# Patient Record
Sex: FEMALE | Race: BLACK OR AFRICAN AMERICAN | NOT HISPANIC OR LATINO | Employment: FULL TIME | RURAL
[De-identification: names, ages, dates, MRNs, and addresses within clinical notes are randomized per-mention and may not be internally consistent; named-entity substitution may affect disease eponyms.]

---

## 2022-09-17 ENCOUNTER — OUTSIDE PLACE OF SERVICE (OUTPATIENT)
Dept: CARDIOLOGY | Facility: HOSPITAL | Age: 52
End: 2022-09-17
Payer: COMMERCIAL

## 2022-09-17 PROCEDURE — 93010 ELECTROCARDIOGRAM REPORT: CPT | Mod: ,,, | Performed by: STUDENT IN AN ORGANIZED HEALTH CARE EDUCATION/TRAINING PROGRAM

## 2022-09-17 PROCEDURE — 93010 PR ELECTROCARDIOGRAM REPORT: ICD-10-PCS | Mod: ,,, | Performed by: STUDENT IN AN ORGANIZED HEALTH CARE EDUCATION/TRAINING PROGRAM

## 2022-09-26 ENCOUNTER — OFFICE VISIT (OUTPATIENT)
Dept: FAMILY MEDICINE | Facility: CLINIC | Age: 52
End: 2022-09-26
Payer: COMMERCIAL

## 2022-09-26 VITALS
OXYGEN SATURATION: 98 % | HEIGHT: 65 IN | RESPIRATION RATE: 18 BRPM | TEMPERATURE: 99 F | BODY MASS INDEX: 38.09 KG/M2 | HEART RATE: 61 BPM | SYSTOLIC BLOOD PRESSURE: 122 MMHG | WEIGHT: 228.63 LBS | DIASTOLIC BLOOD PRESSURE: 70 MMHG

## 2022-09-26 DIAGNOSIS — I10 PRIMARY HYPERTENSION: Chronic | ICD-10-CM

## 2022-09-26 DIAGNOSIS — I25.10 CORONARY ARTERY DISEASE, UNSPECIFIED VESSEL OR LESION TYPE, UNSPECIFIED WHETHER ANGINA PRESENT, UNSPECIFIED WHETHER NATIVE OR TRANSPLANTED HEART: Primary | Chronic | ICD-10-CM

## 2022-09-26 DIAGNOSIS — K21.9 GASTROESOPHAGEAL REFLUX DISEASE WITHOUT ESOPHAGITIS: Chronic | ICD-10-CM

## 2022-09-26 DIAGNOSIS — E78.5 HYPERLIPIDEMIA, UNSPECIFIED HYPERLIPIDEMIA TYPE: Chronic | ICD-10-CM

## 2022-09-26 PROCEDURE — 3008F PR BODY MASS INDEX (BMI) DOCUMENTED: ICD-10-PCS | Mod: ,,, | Performed by: NURSE PRACTITIONER

## 2022-09-26 PROCEDURE — 3078F PR MOST RECENT DIASTOLIC BLOOD PRESSURE < 80 MM HG: ICD-10-PCS | Mod: ,,, | Performed by: NURSE PRACTITIONER

## 2022-09-26 PROCEDURE — 4010F ACE/ARB THERAPY RXD/TAKEN: CPT | Mod: ,,, | Performed by: NURSE PRACTITIONER

## 2022-09-26 PROCEDURE — 99204 PR OFFICE/OUTPT VISIT, NEW, LEVL IV, 45-59 MIN: ICD-10-PCS | Mod: ,,, | Performed by: NURSE PRACTITIONER

## 2022-09-26 PROCEDURE — 4010F PR ACE/ARB THEARPY RXD/TAKEN: ICD-10-PCS | Mod: ,,, | Performed by: NURSE PRACTITIONER

## 2022-09-26 PROCEDURE — 3078F DIAST BP <80 MM HG: CPT | Mod: ,,, | Performed by: NURSE PRACTITIONER

## 2022-09-26 PROCEDURE — 1160F RVW MEDS BY RX/DR IN RCRD: CPT | Mod: ,,, | Performed by: NURSE PRACTITIONER

## 2022-09-26 PROCEDURE — 1159F MED LIST DOCD IN RCRD: CPT | Mod: ,,, | Performed by: NURSE PRACTITIONER

## 2022-09-26 PROCEDURE — 3074F SYST BP LT 130 MM HG: CPT | Mod: ,,, | Performed by: NURSE PRACTITIONER

## 2022-09-26 PROCEDURE — 3074F PR MOST RECENT SYSTOLIC BLOOD PRESSURE < 130 MM HG: ICD-10-PCS | Mod: ,,, | Performed by: NURSE PRACTITIONER

## 2022-09-26 PROCEDURE — 1159F PR MEDICATION LIST DOCUMENTED IN MEDICAL RECORD: ICD-10-PCS | Mod: ,,, | Performed by: NURSE PRACTITIONER

## 2022-09-26 PROCEDURE — 3008F BODY MASS INDEX DOCD: CPT | Mod: ,,, | Performed by: NURSE PRACTITIONER

## 2022-09-26 PROCEDURE — 1160F PR REVIEW ALL MEDS BY PRESCRIBER/CLIN PHARMACIST DOCUMENTED: ICD-10-PCS | Mod: ,,, | Performed by: NURSE PRACTITIONER

## 2022-09-26 PROCEDURE — 99204 OFFICE O/P NEW MOD 45 MIN: CPT | Mod: ,,, | Performed by: NURSE PRACTITIONER

## 2022-09-26 RX ORDER — PANTOPRAZOLE SODIUM 40 MG/1
40 TABLET, DELAYED RELEASE ORAL
COMMUNITY
Start: 2022-09-20 | End: 2022-10-20

## 2022-09-26 RX ORDER — ASPIRIN 81 MG/1
1 TABLET ORAL EVERY MORNING
COMMUNITY
Start: 2022-09-21 | End: 2023-09-21

## 2022-09-26 RX ORDER — FERROUS SULFATE 325(65) MG
325 TABLET, DELAYED RELEASE (ENTERIC COATED) ORAL
COMMUNITY
Start: 2022-09-22 | End: 2022-11-21

## 2022-09-26 RX ORDER — METOPROLOL SUCCINATE 25 MG/1
25 TABLET, EXTENDED RELEASE ORAL
COMMUNITY
Start: 2022-09-21 | End: 2022-10-21

## 2022-09-26 RX ORDER — ATORVASTATIN CALCIUM 40 MG/1
40 TABLET, FILM COATED ORAL
COMMUNITY
Start: 2022-09-20 | End: 2023-02-02 | Stop reason: SDUPTHER

## 2022-09-26 RX ORDER — LOSARTAN POTASSIUM 25 MG/1
25 TABLET ORAL
COMMUNITY
Start: 2022-09-21 | End: 2022-10-21

## 2022-09-26 RX ORDER — ONDANSETRON 4 MG/1
4 TABLET, ORALLY DISINTEGRATING ORAL EVERY 8 HOURS PRN
COMMUNITY
Start: 2022-09-20 | End: 2022-09-27

## 2022-09-26 NOTE — PROGRESS NOTES
Gabriela Espino DNP, FNP    [address] [place] [address]     PATIENT NAME: Ro Hitchcock  : 1970  DATE: 22  MRN: [id number]      Billing Provider: Gabriela Espino DNP, FNP  Level of Service:   Patient PCP Information       Provider PCP Type    Gabriela Espino DNP, FNP General            Reason for Visit / Chief Complaint: Follow-up (Went to [facility]  then to [facility] early in the morning on .  Dx with MI and had to have a stent placed.  Discharged home on  and this is f/u.)       Update PCP  Update Chief Complaint         History of Present Illness / Problem Focused Workflow     Ro Hitchcock presents to the clinic with Follow-up (Went to [facility]  then to [facility] early in the morning on .  Dx with MI and had to have a stent placed.  Discharged home on  and this is f/u.)     Pt is here to establish care. Pt recently hospitalized at [facility] for NSTEMI. Had heart cath with stent x 1. Pt is smoker. Currently trying to stop.   No history of hypertension or diabetes.     Follow-up  Pertinent negatives include no abdominal pain, arthralgias, change in bowel habit, chest pain, chills, congestion, coughing, fatigue, fever, headaches, myalgias, nausea, rash, sore throat, vomiting or weakness.     Review of Systems     Review of Systems   Constitutional:  Negative for activity change, appetite change, chills, fatigue and fever.   HENT:  Negative for nasal congestion, ear pain, hearing loss, postnasal drip and sore throat.    Respiratory:  Negative for cough, chest tightness, shortness of breath and wheezing.    Cardiovascular:  Negative for chest pain, palpitations, leg swelling and claudication.   Gastrointestinal:  Negative for abdominal pain, change in bowel habit, constipation, diarrhea, nausea, vomiting and change in bowel habit.   Genitourinary:  Negative for dysuria.   Musculoskeletal:  Negative for arthralgias, back  pain, gait problem and myalgias.   Integumentary:  Negative for rash.   Neurological:  Negative for weakness and headaches.   Psychiatric/Behavioral:  Negative for suicidal ideas. The patient is not nervous/anxious.       Medical / Social / Family History     Past Medical History:   Diagnosis Date    Coronary artery disease     stent placed 9/19/22    GERD (gastroesophageal reflux disease)     Hyperlipidemia     Hypertension        Past Surgical History:   Procedure Laterality Date    CHOLECYSTECTOMY         Social History  Ms. Ro Hitchcock  reports that she has been smoking cigarettes. She has been smoking an average of .25 packs per day. She has never used smokeless tobacco. She reports that she does not currently use alcohol. She reports that she does not use drugs.    Family History  Ms. Ro Hitchcock's family history includes Cancer in her sister; Diabetes in her father and sister; Hypertension in her mother and sister.    Medications and Allergies     Medications  Outpatient Medications Marked as Taking for the 9/26/22 encounter (Office Visit) with Gabriela Espino, DONNY, FNP   Medication Sig Dispense Refill    aspirin (ECOTRIN) 81 MG EC tablet Take 1 tablet by mouth every morning.      atorvastatin (LIPITOR) 40 MG tablet Take 40 mg by mouth.      ferrous sulfate 325 (65 FE) MG EC tablet Take 325 mg by mouth.      Lactobacillus rhamnosus GG (CULTURELLE) 10 billion cell capsule Take 1 capsule by mouth.      losartan (COZAAR) 25 MG tablet Take 25 mg by mouth.      metoprolol succinate (TOPROL-XL) 25 MG 24 hr tablet Take 25 mg by mouth.      ondansetron (ZOFRAN-ODT) 4 MG TbDL Take 4 mg by mouth every 8 (eight) hours as needed.      pantoprazole (PROTONIX) 40 MG tablet Take 40 mg by mouth.      ticagrelor (BRILINTA) 90 mg tablet Take 90 mg by mouth.         Allergies  Review of patient's allergies indicates:  No Known Allergies    Physical Examination     Vitals:    09/26/22 1358   BP: 122/70   BP Location: Left arm  "  Patient Position: Sitting   BP Method: Large (Manual)   Pulse: 61   Resp: 18   Temp: 98.7 °F (37.1 °C)   TempSrc: Oral   SpO2: 98%   Weight: 103.7 kg (228 lb 9.6 oz)   Height: 5' 5" (1.651 m)     Physical Exam  Vitals and nursing note reviewed.   Constitutional:       General: She is not in acute distress.     Appearance: She is obese.   HENT:      Nose: Nose normal.      Mouth/Throat:      Mouth: Mucous membranes are moist.   Eyes:      Pupils: Pupils are equal, round, and reactive to light.   Cardiovascular:      Rate and Rhythm: Normal rate and regular rhythm.      Pulses: Normal pulses.      Heart sounds: Normal heart sounds. No murmur heard.  Pulmonary:      Effort: Pulmonary effort is normal. No respiratory distress.      Breath sounds: Normal breath sounds. No wheezing, rhonchi or rales.   Chest:      Chest wall: No tenderness.   Abdominal:      General: Bowel sounds are normal.      Palpations: Abdomen is soft.   Musculoskeletal:         General: Normal range of motion.      Cervical back: Normal range of motion and neck supple.      Right lower leg: No edema.      Left lower leg: No edema.   Skin:     General: Skin is warm and dry.   Neurological:      General: No focal deficit present.      Mental Status: She is alert and oriented to person, place, and time.        Assessment and Plan (including Health Maintenance)      Problem List  Smart Sets  Document Outside HM   :    Plan:     Of note, during visit, pt received phone call that her great nephew was in an accident at his home and is not expected to live.     Health Maintenance Due   Topic Date Due    Hepatitis C Screening  Never done    Cervical Cancer Screening  Never done    COVID-19 Vaccine (1) Never done    Pneumococcal Vaccines (Age 0-64) (1 - PCV) Never done    HIV Screening  Never done    TETANUS VACCINE  Never done    Mammogram  Never done    Colorectal Cancer Screening  Never done    Shingles Vaccine (1 of 2) Never done       Problem List " Items Addressed This Visit          Cardiac/Vascular    Coronary artery disease - Primary (Chronic)    Overview     stent placed 9/19/22         Hyperlipidemia (Chronic)    Hypertension (Chronic)       GI    GERD (gastroesophageal reflux disease) (Chronic)     Coronary artery disease, unspecified vessel or lesion type, unspecified whether angina present, unspecified whether native or transplanted heart    Hyperlipidemia, unspecified hyperlipidemia type    Primary hypertension    Gastroesophageal reflux disease without esophagitis     Health Maintenance Topics with due status: Not Due       Topic Last Completion Date    Lipid Panel 09/19/2022    High Dose Statin 09/26/2022           No future appointments.       Follow up if symptoms worsen or fail to improve.     Signature:  Gabriela sEpino DNP, FNP  [address] [place] [address]  Phone #: [phone]  Fax #: [phone]    Date of encounter: 9/26/22    Patient Instructions   Take meds as prescribed. Follow up with cardiology for return to work advice. Follow up here as needed.

## 2022-09-26 NOTE — PATIENT INSTRUCTIONS
Take meds as prescribed. Follow up with cardiology for return to work advice. Follow up here as needed.

## 2023-02-02 ENCOUNTER — OFFICE VISIT (OUTPATIENT)
Dept: FAMILY MEDICINE | Facility: CLINIC | Age: 53
End: 2023-02-02
Payer: COMMERCIAL

## 2023-02-02 VITALS
OXYGEN SATURATION: 98 % | WEIGHT: 234.81 LBS | RESPIRATION RATE: 16 BRPM | DIASTOLIC BLOOD PRESSURE: 83 MMHG | HEART RATE: 89 BPM | SYSTOLIC BLOOD PRESSURE: 139 MMHG | TEMPERATURE: 100 F | HEIGHT: 65 IN | BODY MASS INDEX: 39.12 KG/M2

## 2023-02-02 DIAGNOSIS — I25.10 CORONARY ARTERY DISEASE INVOLVING NATIVE CORONARY ARTERY OF NATIVE HEART, UNSPECIFIED WHETHER ANGINA PRESENT: Chronic | ICD-10-CM

## 2023-02-02 DIAGNOSIS — U07.1 COVID-19: Primary | ICD-10-CM

## 2023-02-02 DIAGNOSIS — J06.9 UPPER RESPIRATORY TRACT INFECTION, UNSPECIFIED TYPE: ICD-10-CM

## 2023-02-02 DIAGNOSIS — I10 PRIMARY HYPERTENSION: Chronic | ICD-10-CM

## 2023-02-02 DIAGNOSIS — E66.01 CLASS 2 SEVERE OBESITY DUE TO EXCESS CALORIES WITH SERIOUS COMORBIDITY AND BODY MASS INDEX (BMI) OF 39.0 TO 39.9 IN ADULT: Chronic | ICD-10-CM

## 2023-02-02 DIAGNOSIS — Z20.822 ENCOUNTER FOR LABORATORY TESTING FOR COVID-19 VIRUS: ICD-10-CM

## 2023-02-02 PROBLEM — E66.812 CLASS 2 SEVERE OBESITY DUE TO EXCESS CALORIES WITH SERIOUS COMORBIDITY AND BODY MASS INDEX (BMI) OF 39.0 TO 39.9 IN ADULT: Chronic | Status: ACTIVE | Noted: 2023-02-02

## 2023-02-02 LAB
CTP QC/QA: YES
FLUAV AG NPH QL: NEGATIVE
FLUBV AG NPH QL: NEGATIVE
SARS-COV-2 AG RESP QL IA.RAPID: POSITIVE

## 2023-02-02 PROCEDURE — 1159F MED LIST DOCD IN RCRD: CPT | Mod: ,,, | Performed by: FAMILY MEDICINE

## 2023-02-02 PROCEDURE — 3075F PR MOST RECENT SYSTOLIC BLOOD PRESS GE 130-139MM HG: ICD-10-PCS | Mod: ,,, | Performed by: FAMILY MEDICINE

## 2023-02-02 PROCEDURE — 99214 OFFICE O/P EST MOD 30 MIN: CPT | Mod: ,,, | Performed by: FAMILY MEDICINE

## 2023-02-02 PROCEDURE — 3008F PR BODY MASS INDEX (BMI) DOCUMENTED: ICD-10-PCS | Mod: ,,, | Performed by: FAMILY MEDICINE

## 2023-02-02 PROCEDURE — 3079F PR MOST RECENT DIASTOLIC BLOOD PRESSURE 80-89 MM HG: ICD-10-PCS | Mod: ,,, | Performed by: FAMILY MEDICINE

## 2023-02-02 PROCEDURE — 87428 SARSCOV & INF VIR A&B AG IA: CPT | Mod: QW,,, | Performed by: FAMILY MEDICINE

## 2023-02-02 PROCEDURE — 3008F BODY MASS INDEX DOCD: CPT | Mod: ,,, | Performed by: FAMILY MEDICINE

## 2023-02-02 PROCEDURE — 3079F DIAST BP 80-89 MM HG: CPT | Mod: ,,, | Performed by: FAMILY MEDICINE

## 2023-02-02 PROCEDURE — 87428 POCT SARS-COV2 (COVID) WITH FLU ANTIGEN: ICD-10-PCS | Mod: QW,,, | Performed by: FAMILY MEDICINE

## 2023-02-02 PROCEDURE — 1160F RVW MEDS BY RX/DR IN RCRD: CPT | Mod: ,,, | Performed by: FAMILY MEDICINE

## 2023-02-02 PROCEDURE — 1159F PR MEDICATION LIST DOCUMENTED IN MEDICAL RECORD: ICD-10-PCS | Mod: ,,, | Performed by: FAMILY MEDICINE

## 2023-02-02 PROCEDURE — 3075F SYST BP GE 130 - 139MM HG: CPT | Mod: ,,, | Performed by: FAMILY MEDICINE

## 2023-02-02 PROCEDURE — 1160F PR REVIEW ALL MEDS BY PRESCRIBER/CLIN PHARMACIST DOCUMENTED: ICD-10-PCS | Mod: ,,, | Performed by: FAMILY MEDICINE

## 2023-02-02 PROCEDURE — 99214 PR OFFICE/OUTPT VISIT, EST, LEVL IV, 30-39 MIN: ICD-10-PCS | Mod: ,,, | Performed by: FAMILY MEDICINE

## 2023-02-02 RX ORDER — DEXBROMPHENIRAMINE MALEATE, DEXTROMETHORPHAN HBR, PHENYLEPHRINE HCL 2; 15; 7.5 MG/5ML; MG/5ML; MG/5ML
5 LIQUID ORAL 3 TIMES DAILY PRN
Qty: 120 ML | Refills: 0 | Status: SHIPPED | OUTPATIENT
Start: 2023-02-02

## 2023-02-02 RX ORDER — ATORVASTATIN CALCIUM 40 MG/1
40 TABLET, FILM COATED ORAL DAILY
Qty: 90 TABLET | Refills: 0 | Status: SHIPPED | OUTPATIENT
Start: 2023-02-02 | End: 2023-08-01

## 2023-02-02 RX ORDER — AZITHROMYCIN 250 MG/1
TABLET, FILM COATED ORAL
Qty: 6 TABLET | Refills: 0 | Status: SHIPPED | OUTPATIENT
Start: 2023-02-02 | End: 2023-02-07

## 2023-02-02 NOTE — LETTER
February 2, 2023      [facility]  [address] [place] [address]  Phone: [phone]  Fax: [phone]       Patient: Ro Hitchcock   YOB: 1970  Date of Visit: 02/02/2023    To Whom It May Concern:    Becky Hitchcock  was at [facility] on 02/02/2023. The patient may return to work/school on 2/7/2023 with no restrictions. If you have any questions or concerns, or if I can be of further assistance, please do not hesitate to contact me.    Sincerely,  MD Kezia Quan LPN

## 2023-02-02 NOTE — PROGRESS NOTES
Varun Abreu MD    [address] [place] [address]     PATIENT NAME: Ro Hitchcock  : 1970  DATE: 23  MRN: [id number]      Billing Provider: Varun Abreu MD  Level of Service: IN OFFICE/OUTPT VISIT, EST, LEVL IV, 30-39 MIN  Patient PCP Information       Provider PCP Type    Gabriela Espino, DONNY, FNP General            Reason for Visit / Chief Complaint: Fever, Sore Throat, Headache, and Cough (X 2 days, 2 positive home COVID test today.)       Update PCP  Update Chief Complaint         History of Present Illness / Problem Focused Workflow     Ro Hitchcock presents to the clinic with Fever, Sore Throat, Headache, and Cough (X 2 days, 2 positive home COVID test today.)     HPI    Review of Systems     Review of Systems     Medical / Social / Family History     Past Medical History:   Diagnosis Date    Coronary artery disease     stent placed 22    GERD (gastroesophageal reflux disease)     Hyperlipidemia     Hypertension        Past Surgical History:   Procedure Laterality Date    CHOLECYSTECTOMY         Social History  Ms.  reports that she has been smoking cigarettes. She has been smoking an average of .25 packs per day. She has never been exposed to tobacco smoke. She has never used smokeless tobacco. She reports that she does not currently use alcohol. She reports that she does not use drugs.    Family History  Ms.'s family history includes Cancer in her sister; Diabetes in her father and sister; Hypertension in her mother and sister.    Medications and Allergies     Medications  Outpatient Medications Marked as Taking for the 23 encounter (Office Visit) with Varun Abreu MD   Medication Sig Dispense Refill    aspirin (ECOTRIN) 81 MG EC tablet Take 1 tablet by mouth every morning.      [DISCONTINUED] atorvastatin (LIPITOR) 40 MG tablet Take 40 mg by mouth.         Allergies  Review of patient's allergies indicates:  No Known  Allergies    Physical Examination     Vitals:    02/02/23 1418   BP: 139/83   Pulse: 89   Resp: 16   Temp: 99.9 °F (37.7 °C)     Physical Exam       Assessment and Plan (including Health Maintenance)      Problem List  Smart Sets  Document Outside HM   :    Plan:         Health Maintenance Due   Topic Date Due    Hepatitis C Screening  Never done    Cervical Cancer Screening  Never done    COVID-19 Vaccine (1) Never done    Pneumococcal Vaccines (Age 0-64) (1 - PCV) Never done    HIV Screening  Never done    TETANUS VACCINE  Never done    Mammogram  Never done    Hemoglobin A1c (Diabetic Prevention Screening)  Never done    Colorectal Cancer Screening  Never done    Shingles Vaccine (1 of 2) Never done       Problem List Items Addressed This Visit          Cardiac/Vascular    Coronary artery disease (Chronic)    Overview     stent placed 9/19/22         Current Assessment & Plan     Increases risk with COVID         Hypertension (Chronic)    Current Assessment & Plan     Increases risk with COVID            ID    COVID-19 - Primary    Current Assessment & Plan     paxlovid prescribed. She has several risk factors.          Relevant Medications    nirmatrelvir-ritonavir 300 mg (150 mg x 2)-100 mg copackaged tablets (EUA)       Endocrine    Class 2 severe obesity due to excess calories with serious comorbidity and body mass index (BMI) of 39.0 to 39.9 in adult (Chronic)    Current Assessment & Plan     Increases risk with COVID          Other Visit Diagnoses       Encounter for laboratory testing for COVID-19 virus        Relevant Orders    POCT SARS-COV2 (COVID) with Flu Antigen (Completed)    Upper respiratory tract infection, unspecified type        Relevant Medications    dexbrompheniramine-phenylep-DM (POLYTUSSIN DM,DEXBROMPHENIRMN,) 2-7.5-15 mg/5 mL Liqd    azithromycin (Z-CYRUS) 250 MG tablet          COVID-19  -     nirmatrelvir-ritonavir 300 mg (150 mg x 2)-100 mg copackaged tablets (EUA); Take 3 tablets by  mouth 2 (two) times daily for 5 days. Each dose contains 2 nirmatrelvir (pink tablets) and 1 ritonavir (white tablet). Take all 3 tablets together  Dispense: 30 tablet; Refill: 0    Encounter for laboratory testing for COVID-19 virus  -     POCT SARS-COV2 (COVID) with Flu Antigen    Upper respiratory tract infection, unspecified type  -     dexbrompheniramine-phenylep-DM (POLYTUSSIN DM,DEXBROMPHENIRMN,) 2-7.5-15 mg/5 mL Liqd; Take 5 mLs by mouth 3 (three) times daily as needed (for COUGH and CONGESTION).  Dispense: 120 mL; Refill: 0  -     azithromycin (Z-CYRUS) 250 MG tablet; Take 2 tablets by mouth on day 1; Take 1 tablet by mouth on days 2-5  Dispense: 6 tablet; Refill: 0    Primary hypertension    Coronary artery disease involving native coronary artery of native heart, unspecified whether angina present    Class 2 severe obesity due to excess calories with serious comorbidity and body mass index (BMI) of 39.0 to 39.9 in adult    Other orders  -     atorvastatin (LIPITOR) 40 MG tablet; Take 1 tablet (40 mg total) by mouth once daily. For CHOLESTEROL  Dispense: 90 tablet; Refill: 0       Health Maintenance Topics with due status: Not Due       Topic Last Completion Date    Lipid Panel 09/19/2022    High Dose Statin 02/02/2023       Procedures     No future appointments.       Follow up if symptoms worsen or fail to improve.     Signature:  Varun Abreu MD  [address] [place] [address]  Phone #: [phone]  Fax #: [phone]    Date of encounter: 2/2/23    There are no Patient Instructions on file for this visit.

## 2023-02-07 ENCOUNTER — PATIENT OUTREACH (OUTPATIENT)
Dept: ADMINISTRATIVE | Facility: HOSPITAL | Age: 53
End: 2023-02-07

## 2023-02-07 NOTE — PROGRESS NOTES
Population Health review of encounter with date of service 02/02/23 with Dr. Abreu.  Added HM Due in Care Coordinator note.  Does not qualify for HY or [facility].  No HY scheduled.  Walk in appt; not added to ON for scheduling.

## 2024-06-03 ENCOUNTER — OFFICE VISIT (OUTPATIENT)
Dept: FAMILY MEDICINE | Facility: CLINIC | Age: 54
End: 2024-06-03
Payer: COMMERCIAL

## 2024-06-03 VITALS
OXYGEN SATURATION: 98 % | BODY MASS INDEX: 39.85 KG/M2 | RESPIRATION RATE: 16 BRPM | SYSTOLIC BLOOD PRESSURE: 133 MMHG | WEIGHT: 239.19 LBS | DIASTOLIC BLOOD PRESSURE: 81 MMHG | HEIGHT: 65 IN | HEART RATE: 78 BPM | TEMPERATURE: 98 F

## 2024-06-03 DIAGNOSIS — Z02.89 ENCOUNTER FOR PHYSICAL EXAMINATION RELATED TO EMPLOYMENT: Primary | ICD-10-CM

## 2024-06-03 PROCEDURE — 99499 UNLISTED E&M SERVICE: CPT | Mod: ,,, | Performed by: FAMILY MEDICINE

## 2024-06-03 RX ORDER — SERTRALINE HYDROCHLORIDE 50 MG/1
50 TABLET, FILM COATED ORAL DAILY
COMMUNITY
Start: 2024-05-11

## 2024-06-03 RX ORDER — ROSUVASTATIN CALCIUM 40 MG/1
40 TABLET, COATED ORAL NIGHTLY
COMMUNITY
Start: 2024-05-20

## 2024-06-03 RX ORDER — CLOPIDOGREL BISULFATE 75 MG/1
75 TABLET ORAL DAILY
COMMUNITY
Start: 2024-05-11

## 2024-06-03 NOTE — PROGRESS NOTES
Varun Abreu MD    [address] [place] [address]     PATIENT NAME: Ro Hitchcock  : 1970  DATE: 6/3/24  MRN: [id number]      Billing Provider: Varun Abreu MD  Level of Service: NO LOS  Patient PCP Information       Provider PCP Type    Primary Doctor No General            Reason for Visit / Chief Complaint: Employment Physical (Physical for her job at [employer]. She states she has to have a physical every 2 years.)       Update PCP  Update Chief Complaint         History of Present Illness / Problem Focused Workflow     Ro Hitchcock presents to the clinic with Employment Physical (Physical for her job at [employer]. She states she has to have a physical every 2 years.)     HPI    Review of Systems     Review of Systems   Constitutional:  Negative for activity change, appetite change, chills, fatigue and fever.   HENT:  Negative for nasal congestion, ear pain, hearing loss, postnasal drip and sore throat.    Respiratory:  Negative for cough, chest tightness, shortness of breath and wheezing.    Cardiovascular:  Negative for chest pain, palpitations, leg swelling and claudication.   Gastrointestinal:  Negative for abdominal pain, change in bowel habit, constipation, diarrhea, nausea and vomiting.   Genitourinary:  Negative for dysuria.   Musculoskeletal:  Negative for arthralgias, back pain, gait problem and myalgias.   Integumentary:  Negative for rash.   Neurological:  Negative for weakness and headaches.   Psychiatric/Behavioral:  Negative for suicidal ideas. The patient is not nervous/anxious.         Medical / Social / Family History     Past Medical History:   Diagnosis Date    Coronary artery disease     stent placed 22    GERD (gastroesophageal reflux disease)     Hyperlipidemia     Hypertension        Past Surgical History:   Procedure Laterality Date    CHOLECYSTECTOMY         Social History  Ms.  reports that  she has been smoking cigarettes. She has never been exposed to tobacco smoke. She has never used smokeless tobacco. She reports that she does not currently use alcohol. She reports that she does not use drugs.    Family History  Ms.'s family history includes Cancer in her sister; Diabetes in her father and sister; Hypertension in her mother and sister.    Medications and Allergies     Medications  Outpatient Medications Marked as Taking for the 6/3/24 encounter (Office Visit) with Varun Abreu MD   Medication Sig Dispense Refill    aspirin (ECOTRIN) 81 MG EC tablet Take 1 tablet by mouth every morning.      clopidogreL (PLAVIX) 75 mg tablet Take 75 mg by mouth once daily.      losartan (COZAAR) 25 MG tablet Take 25 mg by mouth.      rosuvastatin (CRESTOR) 40 MG Tab Take 40 mg by mouth every evening.      sertraline (ZOLOFT) 50 MG tablet Take 50 mg by mouth once daily.         Allergies  Review of patient's allergies indicates:  No Known Allergies    Physical Examination     Vitals:    06/03/24 1427   BP: 133/81   Pulse: 78   Resp: 16   Temp: 98 °F (36.7 °C)     Physical Exam  Vitals and nursing note reviewed.   Constitutional:       General: She is not in acute distress.     Appearance: Normal appearance. She is not ill-appearing.   Eyes:      Extraocular Movements: Extraocular movements intact.      Pupils: Pupils are equal, round, and reactive to light.   Cardiovascular:      Rate and Rhythm: Normal rate and regular rhythm.      Heart sounds: Normal heart sounds.   Pulmonary:      Effort: Pulmonary effort is normal.      Breath sounds: Normal breath sounds.   Abdominal:      General: Bowel sounds are normal.      Palpations: Abdomen is soft.   Musculoskeletal:         General: Normal range of motion.   Skin:     Findings: No rash.   Neurological:      General: No focal deficit present.      Mental Status: She is alert and oriented to person, place, and time. Mental status is at baseline.   Psychiatric:          Mood and Affect: Mood normal.         Behavior: Behavior normal.            Assessment and Plan (including Health Maintenance)      Problem List  Smart Sets  Document Outside HM   :    Plan:     She is cleared for work without restrictions.     Health Maintenance Due   Topic Date Due    Hepatitis C Screening  Never done    Cervical Cancer Screening  Never done    Pneumococcal Vaccines (Age 0-64) (1 of 2 - PCV) Never done    HIV Screening  Never done    TETANUS VACCINE  Never done    Aspirin/Antiplatelet Therapy  Never done    Mammogram  Never done    High Dose Statin  Never done    Hemoglobin A1c (Diabetic Prevention Screening)  Never done    Colorectal Cancer Screening  Never done    Shingles Vaccine (1 of 2) Never done    COVID-19 Vaccine (1 - 2023-24 season) Never done       Problem List Items Addressed This Visit    None  Visit Diagnoses       Encounter for physical examination related to employment    -  Primary            Health Maintenance Topics with due status: Not Due       Topic Last Completion Date    Influenza Vaccine 09/19/2022    Lipid Panel 09/19/2022       Procedures     No future appointments.       No follow-ups on file.     Signature:  Varun Abreu MD  [address] [place] [address]  Phone #: [phone]  Fax #: [phone]    Date of encounter: 6/3/24    There are no Patient Instructions on file for this visit.

## 2024-06-13 ENCOUNTER — OFFICE VISIT (OUTPATIENT)
Dept: FAMILY MEDICINE | Facility: CLINIC | Age: 54
End: 2024-06-13
Payer: COMMERCIAL

## 2024-06-13 VITALS
HEART RATE: 72 BPM | HEIGHT: 65 IN | WEIGHT: 240 LBS | BODY MASS INDEX: 39.99 KG/M2 | TEMPERATURE: 98 F | SYSTOLIC BLOOD PRESSURE: 112 MMHG | OXYGEN SATURATION: 96 % | DIASTOLIC BLOOD PRESSURE: 75 MMHG | RESPIRATION RATE: 18 BRPM

## 2024-06-13 DIAGNOSIS — Z13.1 SCREENING FOR DIABETES MELLITUS: ICD-10-CM

## 2024-06-13 DIAGNOSIS — Z13.220 SCREENING FOR LIPOID DISORDERS: ICD-10-CM

## 2024-06-13 DIAGNOSIS — Z12.31 SCREENING MAMMOGRAM FOR BREAST CANCER: ICD-10-CM

## 2024-06-13 DIAGNOSIS — Z23 IMMUNIZATION DUE: ICD-10-CM

## 2024-06-13 DIAGNOSIS — Z00.01 ENCOUNTER FOR GENERAL ADULT MEDICAL EXAMINATION WITH ABNORMAL FINDINGS: Primary | ICD-10-CM

## 2024-06-13 DIAGNOSIS — Z12.11 SCREENING FOR MALIGNANT NEOPLASM OF COLON: ICD-10-CM

## 2024-06-13 DIAGNOSIS — E66.01 CLASS 2 SEVERE OBESITY DUE TO EXCESS CALORIES WITH SERIOUS COMORBIDITY AND BODY MASS INDEX (BMI) OF 39.0 TO 39.9 IN ADULT: Chronic | ICD-10-CM

## 2024-06-13 PROBLEM — U07.1 COVID-19: Status: RESOLVED | Noted: 2023-02-02 | Resolved: 2024-06-13

## 2024-06-13 LAB
CHOLEST SERPL-MCNC: 129 MG/DL (ref 0–200)
CHOLEST/HDLC SERPL: 2.6 {RATIO}
GLUCOSE SERPL-MCNC: 81 MG/DL (ref 74–106)
HDLC SERPL-MCNC: 50 MG/DL (ref 40–60)
LDLC SERPL CALC-MCNC: 31 MG/DL
NONHDLC SERPL-MCNC: 79 MG/DL
TRIGL SERPL-MCNC: 241 MG/DL (ref 35–150)
VLDLC SERPL-MCNC: 48 MG/DL

## 2024-06-13 PROCEDURE — 3074F SYST BP LT 130 MM HG: CPT | Mod: ,,, | Performed by: FAMILY MEDICINE

## 2024-06-13 PROCEDURE — 1159F MED LIST DOCD IN RCRD: CPT | Mod: ,,, | Performed by: FAMILY MEDICINE

## 2024-06-13 PROCEDURE — 1160F RVW MEDS BY RX/DR IN RCRD: CPT | Mod: ,,, | Performed by: FAMILY MEDICINE

## 2024-06-13 PROCEDURE — 4010F ACE/ARB THERAPY RXD/TAKEN: CPT | Mod: ,,, | Performed by: FAMILY MEDICINE

## 2024-06-13 PROCEDURE — 3078F DIAST BP <80 MM HG: CPT | Mod: ,,, | Performed by: FAMILY MEDICINE

## 2024-06-13 PROCEDURE — 80061 LIPID PANEL: CPT | Mod: ,,, | Performed by: CLINICAL MEDICAL LABORATORY

## 2024-06-13 PROCEDURE — 3008F BODY MASS INDEX DOCD: CPT | Mod: ,,, | Performed by: FAMILY MEDICINE

## 2024-06-13 PROCEDURE — 90471 IMMUNIZATION ADMIN: CPT | Mod: ,,, | Performed by: FAMILY MEDICINE

## 2024-06-13 PROCEDURE — 90677 PCV20 VACCINE IM: CPT | Mod: ,,, | Performed by: FAMILY MEDICINE

## 2024-06-13 PROCEDURE — 99396 PREV VISIT EST AGE 40-64: CPT | Mod: 25,,, | Performed by: FAMILY MEDICINE

## 2024-06-13 PROCEDURE — 82947 ASSAY GLUCOSE BLOOD QUANT: CPT | Mod: ,,, | Performed by: CLINICAL MEDICAL LABORATORY

## 2024-06-13 RX ORDER — LOSARTAN POTASSIUM 50 MG/1
50 TABLET ORAL DAILY
COMMUNITY

## 2024-06-13 NOTE — PROGRESS NOTES
Subjective     Patient ID: Ro Hitchcock is a 54 y.o. female.    Chief Complaint: Healthy You ([employer] Healthy You), Did not bring medications (Verbally reviewed.), Nicotine Dependence (She would like to discuss smoking cessation options today.), and Health Maintenance (She would like to discuss getting her screening mammogram and colonoscopy done.)          HPI  Review of Systems   Constitutional:  Negative for activity change, appetite change, chills, fatigue and fever.   HENT:  Negative for nasal congestion, ear pain, hearing loss, postnasal drip and sore throat.    Respiratory:  Negative for cough, chest tightness, shortness of breath and wheezing.    Cardiovascular:  Negative for chest pain, palpitations, leg swelling and claudication.   Gastrointestinal:  Negative for abdominal pain, change in bowel habit, constipation, diarrhea, nausea and vomiting.   Genitourinary:  Negative for dysuria.   Musculoskeletal:  Negative for arthralgias, back pain, gait problem and myalgias.   Integumentary:  Negative for rash.   Neurological:  Negative for weakness and headaches.   Psychiatric/Behavioral:  Negative for suicidal ideas. The patient is not nervous/anxious.        Tobacco Use: High Risk (6/13/2024)    Patient History     Smoking Tobacco Use: Every Day     Smokeless Tobacco Use: Never     Passive Exposure: Never     Review of patient's allergies indicates:  No Known Allergies  Current Outpatient Medications   Medication Instructions    aspirin (ECOTRIN) 81 MG EC tablet 1 tablet, Oral, Every morning    clopidogreL (PLAVIX) 75 mg, Oral, Daily    losartan (COZAAR) 50 mg, Oral, Daily    rosuvastatin (CRESTOR) 40 mg, Oral, Nightly    sertraline (ZOLOFT) 50 mg, Oral, Daily     Medications Discontinued During This Encounter   Medication Reason    losartan (COZAAR) 25 MG tablet        Past Medical History:   Diagnosis Date    Coronary artery disease     stent placed 9/19/22    GERD (gastroesophageal reflux disease)      "Hyperlipidemia     Hypertension      Health Maintenance Topics with due status: Not Due       Topic Last Completion Date    Influenza Vaccine 09/19/2022    Lipid Panel 09/19/2022    High Dose Statin 06/03/2024    Aspirin/Antiplatelet Therapy 06/03/2024     Immunization History   Administered Date(s) Administered    Influenza - Quadrivalent - PF *Preferred* (6 months and older) 09/19/2022       Objective     Body mass index is 39.94 kg/m².  Wt Readings from Last 3 Encounters:   06/13/24 108.9 kg (240 lb)   06/03/24 108.5 kg (239 lb 3.2 oz)   02/02/23 106.5 kg (234 lb 12.8 oz)     Ht Readings from Last 3 Encounters:   06/13/24 5' 5" (1.651 m)   06/03/24 5' 5" (1.651 m)   02/02/23 5' 5" (1.651 m)     BP Readings from Last 3 Encounters:   06/13/24 112/75   06/03/24 133/81   02/02/23 139/83     Temp Readings from Last 3 Encounters:   06/13/24 98.1 °F (36.7 °C) (Oral)   06/03/24 98 °F (36.7 °C) (Oral)   02/02/23 99.9 °F (37.7 °C) (Oral)     Pulse Readings from Last 3 Encounters:   06/13/24 72   06/03/24 78   02/02/23 89     Resp Readings from Last 3 Encounters:   06/13/24 18   06/03/24 16   02/02/23 16     PF Readings from Last 3 Encounters:   No data found for PF       Physical Exam  Constitutional:       Appearance: Normal appearance.   HENT:      Head: Normocephalic and atraumatic.   Eyes:      Extraocular Movements: Extraocular movements intact.   Cardiovascular:      Rate and Rhythm: Normal rate and regular rhythm.   Pulmonary:      Effort: Pulmonary effort is normal.      Breath sounds: Normal breath sounds.   Skin:     General: Skin is warm.   Neurological:      Mental Status: She is alert and oriented to person, place, and time. Mental status is at baseline.         Assessment and Plan     Problem List Items Addressed This Visit          Endocrine    Class 2 severe obesity due to excess calories with serious comorbidity and body mass index (BMI) of 39.0 to 39.9 in adult (Chronic)     Other Visit Diagnoses       " Encounter for general adult medical examination with abnormal findings    -  Primary    Screening for diabetes mellitus        Relevant Orders    Glucose, Random    Screening for lipoid disorders        Relevant Orders    Lipid Panel    Screening for malignant neoplasm of colon        Relevant Orders    Colonoscopy    Screening mammogram for breast cancer        Relevant Orders    Mammo Digital Screening Bilat              Plan:       I have reviewed the medications, allergies, and problem list.     Goal Actions:    What type of visit is the patient here for today?: Healthy You  Does the patient consent to enroll in [facility] Healthy?: Yes  Is this a Wellness Follow Up?: No  What is your overall wellness goal? (select at least one): Lifestyle modifications, Lose weight, Understand disease process, Improve overall health  Choose 3: Biometric, Lifestyle, Nutrition, Exercise, Tobacco  Biometric Actions: Attend regularly scheduled office visits, Monitor and record \report B\P log, Monitor, record and report glucose levels, LDL Chol<100, SBP<120 and DBP<80, BMI>30 lose 1-2 lbs per week  Lifestyle Actions : Obtain yearly mammogram, Schedule colonoscopy, sigmoidoscopy, or Colorguard if applicable, BMD if applicable, Pap smear or HPV, Develop good support system, Take medications as prescribed  Nurtrition Actions: Avoid adding table salt, Read nutrition labels, Recommend weight loss, Eat a well-balanced diet, Eat heart healthy diet, adhere to a specific caloric diet, Avoid carbonated beverages, Decrease intake of fast food, DASH diet  Exercise Actions: Recommend physical activity 30 minutes per day 3-5 times/week  Tobacco Actions: Avoid all tobacco products including 2nd and 3rd hand exposure, Refer for tobacco free if part of plan, Patient will not stop using tobacco recommend reducing amt of consumption per day

## 2024-06-13 NOTE — PATIENT INSTRUCTIONS
"Patient Education       High Blood Pressure in Adults   The Basics   Written by the doctors and editors at [facility]   What is high blood pressure? -- High blood pressure is a condition that puts you at risk for heart attack, stroke, and kidney disease. It does not usually cause symptoms. But it can be serious.  When your doctor or nurse tells you your blood pressure, they will say 2 numbers. For instance, your doctor or nurse might say that your blood pressure is "130 over 80." The top number is the pressure inside your arteries when your heart is compa. The bottom number is the pressure inside your arteries when your heart is relaxed.  "Elevated blood pressure" is a term doctors or nurses use as a warning. People with elevated blood pressure do not yet have high blood pressure. But their blood pressure is not as low as it should be for good health.  Many experts define high, elevated, and normal blood pressure as follows:  High - Top number of 130 or above and/or bottom number of 80 or above  Elevated - Top number between 120 and 129 and bottom number of 79 or below  Normal - Top number of 119 or below and bottom number of 79 or below  This information is also in the table (table 1).   How can I lower my blood pressure? -- If your doctor or nurse has prescribed blood pressure medicine, the most important thing you can do is to take it. If it causes side effects, do not just stop taking it. Instead, talk to your doctor or nurse about the problems it causes. They might be able to lower your dose or switch you to another medicine. If cost is a problem, mention that too. They might be able to put you on a less expensive medicine. Taking your blood pressure medicine can keep you from having a heart attack or stroke, and it can save your life!  Can I do anything on my own? -- You have a lot of control over your blood pressure. To lower it:  Lose weight (if you are overweight)  Choose a diet low in fat and rich in " "fruits, vegetables, and low-fat dairy products  Reduce the amount of salt you eat  Do something active for at least 30 minutes a day on most days of the week  Cut down on alcohol (if you drink more than 2 alcoholic drinks per day)  It's also a good idea to get a home blood pressure meter. People who check their own blood pressure at home do better at keeping it low and can sometimes even reduce the amount of medicine they take.  All topics are updated as new evidence becomes available and our peer review process is complete.  This topic retrieved from [employer] on: Sep 21, 2021.  Topic 47301 Version 15.0  Release: 29.4.2 - C29.263  © 2021 UpToDate, Inc. and/or its affiliates. All rights reserved.  table 1: Definition of normal and high blood pressure  Level  Top number  Bottom number    High 130 or above 80 or above   Elevated 120 to 129 79 or below   Normal 119 or below 79 or below   These definitions are from the American College of Cardiology/American Heart Association. Other expert groups might use slightly different definitions.  "Elevated blood pressure" is a term doctor or nurses use as a warning. It means you do not yet have high blood pressure, but your blood pressure is not as low as it should be for good health.  Graphic 51713 Version 6.0  Consumer Information Use and Disclaimer   This information is not specific medical advice and does not replace information you receive from your health care provider. This is only a brief summary of general information. It does NOT include all information about conditions, illnesses, injuries, tests, procedures, treatments, therapies, discharge instructions or life-style choices that may apply to you. You must talk with your health care provider for complete information about your health and treatment options. This information should not be used to decide whether or not to accept your health care provider's advice, instructions or recommendations. Only your health care " "provider has the knowledge and training to provide advice that is right for you. The use of this information is governed by the "[employer] End User License Agreement, available at https://www.[employer].GPal/en/solutions/fotobabble/about/ming.The use of [employer] content is governed by the [employer] Terms of Use. ©2021 UpToDate, Inc. All rights reserved.  Copyright   © 2021 UpToDate, Inc. and/or its affiliates. All rights reserved.    Patient Education       High Cholesterol   The Basics   Written by the doctors and editors at [email or web address]   What is cholesterol? -- Cholesterol is a substance that is found in the blood. Everyone has some. It is needed for good health. The problem is, people sometimes have too much cholesterol. Compared with people with normal cholesterol, people with high cholesterol have a higher risk of heart attacks, strokes, and other health problems. The higher your cholesterol, the higher your risk of these problems.  Are there different types of cholesterol? -- Yes, there are a few different types. If you get a cholesterol test, you might hear your doctor or nurse talk about:  Total cholesterol  LDL cholesterol - Some people call this the "bad" cholesterol. That's because having high LDL levels raises your risk of heart attacks, strokes, and other health problems.  HDL cholesterol - Some people call this the "good" cholesterol. That's because people with high HDL levels tend to have a lower risk of heart attacks, strokes, and other health problems.   Non-HDL cholesterol - Non-HDL cholesterol is your total cholesterol minus your HDL cholesterol.  Triglycerides - Triglycerides are not cholesterol. They are another type of fat. But they often get measured when cholesterol is measured. (Having high triglycerides also seems to increase the risk of heart attacks and strokes.)  What should my numbers be? -- Ask your doctor or nurse what your numbers should be. Different people need different goals. (If you " "live outside the [place], see the table (table 1)). In general, people who do not already have heart disease should aim for:  Total cholesterol below 200  LDL cholesterol below 130 - or much lower, if they are at risk of heart attacks or strokes  HDL cholesterol above 60  Non-HDL cholesterol below 160 - or lower, if they are at risk of heart attacks or strokes  Triglycerides below 150  Keep in mind, though, that many people who cannot meet these goals still have a low risk of heart attacks and strokes.  What should I do if my doctor tells me I have high cholesterol? -- Ask your doctor what your overall risk of heart attacks and strokes is. High cholesterol, by itself, is not always a reason to worry. Having high cholesterol is just one of many things that can increase your risk of heart attacks and strokes. Other factors that increase your risk include:  Cigarette smoking  High blood pressure  Having a parent, sister, or brother who got heart disease at a young age - Young, in this case, means younger than 55 for men and younger than 65 for women.  A diet that is not heart healthy - A "heart-healthy" diet includes lots of fruits and vegetables, fiber, and healthy fats (like those found in fish and certain oils). It also means limiting sugar and unhealthy fats.  Older age  If you are at high risk of heart attacks and strokes, having high cholesterol is a problem. On the other hand, if you are at low risk, having high cholesterol might not lead to treatment.  Should I take medicine to lower cholesterol? -- Not everyone who has high cholesterol needs medicines. Your doctor or nurse will decide if you need them based on your age, family history, and other health concerns.  You should probably take a cholesterol-lowering medicine called a statin if you:  Already had a heart attack or stroke  Have known heart disease  Have diabetes  Have a condition called peripheral artery disease, which makes it painful to walk, " and happens when the arteries in your legs get clogged with fatty deposits  Have an abdominal aortic aneurysm, which is a widening of the main artery in the belly  Most people with any of the conditions listed above should take a statin no matter what their cholesterol level is. If your doctor or nurse puts you on a statin, stay on it. The medicine might not make you feel any different. But it can help prevent heart attacks, strokes, and death.  Can I lower my cholesterol without medicines? -- Yes, you can lower your cholesterol some by:  Avoiding red meat, butter, fried foods, cheese, and other foods that have a lot of saturated fat  Losing weight (if you are overweight)  Being more active  Even if these steps do little to change your cholesterol, they can improve your health in many ways.  All topics are updated as new evidence becomes available and our peer review process is complete.  This topic retrieved from [employer] on: Sep 21, 2021.  Topic 21135 Version 19.0  Release: 29.4.2 - C29.263  © 2021 UpToDate, Inc. and/or its affiliates. All rights reserved.  table 1: Cholesterol and triglyceride measurements in the [place] and elsewhere     Measurement used within the [place] Milligrams/deciliter (mg/dL)  Measurement used most places outside the [place] Millimoles/liter (mmol/Liter)     Level to aim for  Level to aim for    Total cholesterol  Below 200 Below 5.17   LDL cholesterol  Below 130 - or much lower if at risk of heart attack and stroke Below 3.36 - or much lower if at risk of heart attack and stroke   HDL cholesterol  Above 60 Above 1.55   Triglycerides  Below 150 Below 1.7   Cholesterol is measured differently in the [place] than it is in most other countries. This table shows values used within and outside the [place]. It includes the cholesterol and triglyceride levels that most people who do not have heart disease should aim for.  Graphic 14488 Version 3.0  Consumer  "Information Use and Disclaimer   This information is not specific medical advice and does not replace information you receive from your health care provider. This is only a brief summary of general information. It does NOT include all information about conditions, illnesses, injuries, tests, procedures, treatments, therapies, discharge instructions or life-style choices that may apply to you. You must talk with your health care provider for complete information about your health and treatment options. This information should not be used to decide whether or not to accept your health care provider's advice, instructions or recommendations. Only your health care provider has the knowledge and training to provide advice that is right for you. The use of this information is governed by the [employer] End User License Agreement, available at https://www.[employer]/en/solutions/Wecash/about/ming.The use of [employer] content is governed by the [employer] Terms of Use. ©2021 UpToDate, Inc. All rights reserved.  Copyright   © 2021 UpToDate, Inc. and/or its affiliates. All rights reserved.    Patient Education       High Blood Pressure in Adults   The Basics   Written by the doctors and editors at [facility]   What is high blood pressure? -- High blood pressure is a condition that puts you at risk for heart attack, stroke, and kidney disease. It does not usually cause symptoms. But it can be serious.  When your doctor or nurse tells you your blood pressure, they will say 2 numbers. For instance, your doctor or nurse might say that your blood pressure is "130 over 80." The top number is the pressure inside your arteries when your heart is compa. The bottom number is the pressure inside your arteries when your heart is relaxed.  "Elevated blood pressure" is a term doctors or nurses use as a warning. People with elevated blood pressure do not yet have high blood pressure. But their blood pressure is not as low as it " should be for good health.  Many experts define high, elevated, and normal blood pressure as follows:  High - Top number of 130 or above and/or bottom number of 80 or above  Elevated - Top number between 120 and 129 and bottom number of 79 or below  Normal - Top number of 119 or below and bottom number of 79 or below  This information is also in the table (table 1).   How can I lower my blood pressure? -- If your doctor or nurse has prescribed blood pressure medicine, the most important thing you can do is to take it. If it causes side effects, do not just stop taking it. Instead, talk to your doctor or nurse about the problems it causes. They might be able to lower your dose or switch you to another medicine. If cost is a problem, mention that too. They might be able to put you on a less expensive medicine. Taking your blood pressure medicine can keep you from having a heart attack or stroke, and it can save your life!  Can I do anything on my own? -- You have a lot of control over your blood pressure. To lower it:  Lose weight (if you are overweight)  Choose a diet low in fat and rich in fruits, vegetables, and low-fat dairy products  Reduce the amount of salt you eat  Do something active for at least 30 minutes a day on most days of the week  Cut down on alcohol (if you drink more than 2 alcoholic drinks per day)  It's also a good idea to get a home blood pressure meter. People who check their own blood pressure at home do better at keeping it low and can sometimes even reduce the amount of medicine they take.  All topics are updated as new evidence becomes available and our peer review process is complete.  This topic retrieved from [employer] on: Sep 21, 2021.  Topic 27926 Version 15.0  Release: 29.4.2 - C29.263  © 2021 UpToDate, Inc. and/or its affiliates. All rights reserved.  table 1: Definition of normal and high blood pressure  Level  Top number  Bottom number    High 130 or above 80 or above   Elevated 120  "to 129 79 or below   Normal 119 or below 79 or below   These definitions are from the American College of Cardiology/American Heart Association. Other expert groups might use slightly different definitions.  "Elevated blood pressure" is a term doctor or nurses use as a warning. It means you do not yet have high blood pressure, but your blood pressure is not as low as it should be for good health.  Graphic 87811 Version 6.0  Consumer Information Use and Disclaimer   This information is not specific medical advice and does not replace information you receive from your health care provider. This is only a brief summary of general information. It does NOT include all information about conditions, illnesses, injuries, tests, procedures, treatments, therapies, discharge instructions or life-style choices that may apply to you. You must talk with your health care provider for complete information about your health and treatment options. This information should not be used to decide whether or not to accept your health care provider's advice, instructions or recommendations. Only your health care provider has the knowledge and training to provide advice that is right for you. The use of this information is governed by the [employer] End User License Agreement, available at https://www.[employer]/en/solutions/Help Remedies/about/ming.The use of [employer] content is governed by the [employer] Terms of Use. ©2021 UpToDate, Inc. All rights reserved.  Copyright   © 2021 UpToDate, Inc. and/or its affiliates. All rights reserved.    Patient Education       High Cholesterol   The Basics   Written by the doctors and editors at [facility]   What is cholesterol? -- Cholesterol is a substance that is found in the blood. Everyone has some. It is needed for good health. The problem is, people sometimes have too much cholesterol. Compared with people with normal cholesterol, people with high cholesterol have a higher risk of heart attacks, " "strokes, and other health problems. The higher your cholesterol, the higher your risk of these problems.  Are there different types of cholesterol? -- Yes, there are a few different types. If you get a cholesterol test, you might hear your doctor or nurse talk about:  Total cholesterol  LDL cholesterol - Some people call this the "bad" cholesterol. That's because having high LDL levels raises your risk of heart attacks, strokes, and other health problems.  HDL cholesterol - Some people call this the "good" cholesterol. That's because people with high HDL levels tend to have a lower risk of heart attacks, strokes, and other health problems.   Non-HDL cholesterol - Non-HDL cholesterol is your total cholesterol minus your HDL cholesterol.  Triglycerides - Triglycerides are not cholesterol. They are another type of fat. But they often get measured when cholesterol is measured. (Having high triglycerides also seems to increase the risk of heart attacks and strokes.)  What should my numbers be? -- Ask your doctor or nurse what your numbers should be. Different people need different goals. (If you live outside the [place], see the table (table 1)). In general, people who do not already have heart disease should aim for:  Total cholesterol below 200  LDL cholesterol below 130 - or much lower, if they are at risk of heart attacks or strokes  HDL cholesterol above 60  Non-HDL cholesterol below 160 - or lower, if they are at risk of heart attacks or strokes  Triglycerides below 150  Keep in mind, though, that many people who cannot meet these goals still have a low risk of heart attacks and strokes.  What should I do if my doctor tells me I have high cholesterol? -- Ask your doctor what your overall risk of heart attacks and strokes is. High cholesterol, by itself, is not always a reason to worry. Having high cholesterol is just one of many things that can increase your risk of heart attacks and strokes. Other factors " "that increase your risk include:  Cigarette smoking  High blood pressure  Having a parent, sister, or brother who got heart disease at a young age - Young, in this case, means younger than 55 for men and younger than 65 for women.  A diet that is not heart healthy - A "heart-healthy" diet includes lots of fruits and vegetables, fiber, and healthy fats (like those found in fish and certain oils). It also means limiting sugar and unhealthy fats.  Older age  If you are at high risk of heart attacks and strokes, having high cholesterol is a problem. On the other hand, if you are at low risk, having high cholesterol might not lead to treatment.  Should I take medicine to lower cholesterol? -- Not everyone who has high cholesterol needs medicines. Your doctor or nurse will decide if you need them based on your age, family history, and other health concerns.  You should probably take a cholesterol-lowering medicine called a statin if you:  Already had a heart attack or stroke  Have known heart disease  Have diabetes  Have a condition called peripheral artery disease, which makes it painful to walk, and happens when the arteries in your legs get clogged with fatty deposits  Have an abdominal aortic aneurysm, which is a widening of the main artery in the belly  Most people with any of the conditions listed above should take a statin no matter what their cholesterol level is. If your doctor or nurse puts you on a statin, stay on it. The medicine might not make you feel any different. But it can help prevent heart attacks, strokes, and death.  Can I lower my cholesterol without medicines? -- Yes, you can lower your cholesterol some by:  Avoiding red meat, butter, fried foods, cheese, and other foods that have a lot of saturated fat  Losing weight (if you are overweight)  Being more active  Even if these steps do little to change your cholesterol, they can improve your health in many ways.  All topics are updated as new " evidence becomes available and our peer review process is complete.  This topic retrieved from [employer] on: Sep 21, 2021.  Topic 57442 Version 19.0  Release: 29.4.2 - C29.263  © 2021 UpToDate, Inc. and/or its affiliates. All rights reserved.  table 1: Cholesterol and triglyceride measurements in the [place] and elsewhere     Measurement used within the [place] Milligrams/deciliter (mg/dL)  Measurement used most places outside the [place] Millimoles/liter (mmol/Liter)     Level to aim for  Level to aim for    Total cholesterol  Below 200 Below 5.17   LDL cholesterol  Below 130 - or much lower if at risk of heart attack and stroke Below 3.36 - or much lower if at risk of heart attack and stroke   HDL cholesterol  Above 60 Above 1.55   Triglycerides  Below 150 Below 1.7   Cholesterol is measured differently in the [place] than it is in most other countries. This table shows values used within and outside the [place]. It includes the cholesterol and triglyceride levels that most people who do not have heart disease should aim for.  Graphic 25552 Version 3.0  Consumer Information Use and Disclaimer   This information is not specific medical advice and does not replace information you receive from your health care provider. This is only a brief summary of general information. It does NOT include all information about conditions, illnesses, injuries, tests, procedures, treatments, therapies, discharge instructions or life-style choices that may apply to you. You must talk with your health care provider for complete information about your health and treatment options. This information should not be used to decide whether or not to accept your health care provider's advice, instructions or recommendations. Only your health care provider has the knowledge and training to provide advice that is right for you. The use of this information is governed by the [employer] End User License Agreement,  available at https://www.woltersIceMos [employer]er.com/en/solutions/lexicomp/about/ming.The use of [employer] content is governed by the [employer] Terms of Use. ©2021 UpToDate, Inc. All rights reserved.  Copyright   © 2021 UpToDate, Inc. and/or its affiliates. All rights reserved.    Patient Education       Diet and Health   The Basics   Written [facility] and editors at [facility]   Why is it important to eat a healthy diet? -- It's important to eat a healthy diet because eating the right foods can keep you healthy now and later on in life.  Which foods are especially healthy? -- Foods that are especially healthy include:  Fruits and vegetables - Eating a diet with lots of fruits and vegetables can help prevent heart disease and strokes. It might also help prevent certain types of cancers. Try to eat fruits and vegetables at each meal and also for snacks. If you don't have fresh fruits and vegetables available, you can eat frozen or canned ones instead. Doctors recommend eating at least 2 1/2 servings of vegetables and 2 servings of fruits each day.  Foods with fiber - Eating foods with a lot of fiber can help prevent heart disease and strokes. If you have type 2 diabetes, it can also help control your blood sugar. Foods that have a lot of fiber include vegetables, fruits, beans, nuts, oatmeal, and whole grain breads and cereals. You can tell how much fiber is in a food by reading the nutrition label (figure 1). Doctors recommend eating 25 to 36 grams of fiber each day.  Foods with folate (also called folic acid) - Folate is a vitamin that is important for pregnant people, since it helps prevent certain birth defects. Anyone who could get pregnant should get at least 400 micrograms of folic acid daily, whether or not they are actively trying to get pregnant. Folate is found in many breakfast cereals, oranges, orange juice, and green leafy vegetables.  Foods with calcium and vitamin D - Babies, children, and adults need calcium  "and vitamin D to help keep their bones strong. Adults also need calcium and vitamin D to help prevent osteoporosis. Osteoporosis is a condition that causes bones to get "thin" and break more easily than usual. Different foods and drinks have calcium and vitamin D in them (figure 2). People who don't get enough calcium and vitamin D in their diet might need to take a supplement.  Foods with protein - Protein helps your muscles stay strong. Healthy foods with a lot of protein include chicken, fish, eggs, beans, nuts, and soy products. Red meat also has a lot of protein, but it also contains fats, which can be unhealthy.  Some experts recommend a "Mediterranean diet." This involves eating a lot of fruits, vegetables, nuts, whole grains, and olive oil. It also includes some fish, poultry, and dairy products, but not a lot of red meat. Eating this way can help your overall health, and might even lower your risk of having a stroke.  What foods should I avoid or limit? -- To eat a healthy diet, there are some things you should avoid or limit. They include:   Fats - There are different types of fats. Some types of fats are better for your body than others.  Trans fats are especially unhealthy. They are found in margarines, many fast foods, and some store-bought baked goods. Trans fats can raise your cholesterol level and your increase your chance of getting heart disease. You should avoid eating foods with these types of fats.  The type of "polyunsaturated" fats found in fish seems to be healthy and can reduce your chance of getting heart disease. Other polyunsaturated fats might also be good for your health. When you cook, it's best to use oils with healthier fats, such as olive oil and canola oil.  Sugar - To have a healthy diet, it's important to limit or avoid sugar, sweets, and refined grains. Refined grains are found in white bread, white rice, most forms of pasta, and most packaged "snack" foods. Whole grains, such " "as whole-wheat bread and brown rice, have more fiber and are better for your health.  Avoiding sugar-sweetened beverages, like soda and sports drinks, can also help improve your health.  Red meat - Studies have shown that eating a lot of red meat can increase your risk of certain health problems, including heart disease and cancer. You should limit the amount of red meat that you eat.  Can I drink alcohol as part of a healthy diet? -- People who drink a small amount of alcohol each day might have a lower chance of getting heart disease. But drinking alcohol can lead to problems. For example, it can raise a person's chances of getting liver disease and certain types of cancers. In women, even 1 drink a day can increase the risk of getting breast cancer.  Most doctors recommend that adult women not have more than 1 drink a day and that adult men not have more than 2 drinks a day. The limits are different because most women's bodies take longer to break down alcohol.  How many calories do I need each day? -- The number of calories you need each day depends on your weight, height, age, sex, and how active you are.  Your doctor or nurse can tell you how many calories you should eat each day. If you are trying to lose weight, you should eat fewer calories each day.  What if I have questions? -- If you have questions about which foods you should or should not eat, ask your doctor or nurse. The right diet for you will depend, in part, on your health and any medical conditions you have.  All topics are updated as new evidence becomes available and our peer review process is complete.  This topic retrieved from [employer] on: Sep 21, 2021.  Topic 93685 Version 20.0  Release: 29.4.2 - C29.263  © 2021 UpToDate, Inc. and/or its affiliates. All rights reserved.  figure 1: Nutrition label - fiber     This is an example of a nutrition label. To figure out how much fiber is in a food, look for the line that says "Dietary Fiber." It's " "also important to look at the serving size. This food has 7 grams of fiber in each serving, and each serving is 1 cup.  Graphic 17284 Version 7.0    figure 2: Foods and drinks with calcium and vitamin D     Foods rich in calcium include ice cream, soy milk, breads, kale, broccoli, milk, cheese, cottage cheese, almonds, yogurt, ready-to-eat cereals, beans, and tofu. Foods rich in vitamin D include milk, fortified plant-based "milks" (soy, almond), canned tuna fish, cod liver oil, yogurt, ready-to-eat-cereals, cooked salmon, canned sardines, mackerel, and eggs. Some of these foods are rich in both.  Graphic 99355 Version 4.0    Consumer Information Use and Disclaimer   This information is not specific medical advice and does not replace information you receive from your health care provider. This is only a brief summary of general information. It does NOT include all information about conditions, illnesses, injuries, tests, procedures, treatments, therapies, discharge instructions or life-style choices that may apply to you. You must talk with your health care provider for complete information about your health and treatment options. This information should not be used to decide whether or not to accept your health care provider's advice, instructions or recommendations. Only your health care provider has the knowledge and training to provide advice that is right for you. The use of this information is governed by the [employer] End User License Agreement, available at https://www.[employer]/en/solutions/[place] [employer]/about/ming.The use of [employer] content is governed by the [employer] Terms of Use. ©2021 UpToDate, Inc. All rights reserved.  Copyright   © 2021 UpToDate, Inc. and/or its affiliates. All rights reserved.    Patient Education       Smoking: Not Just Harmful to Your Lungs and Heart   About this topic   Smoking is very common at any age. It is one of the leading causes of poor health and illness. Smoking can " lead to death. It has many harmful chemicals that are released by the tobacco you smoke and inhale. Tobacco has many forms. These are:  Cigarettes  Pipes  Cigars  Chewing tobacco  Electronic cigarettes  Loose  Hookah  All kinds of tobaccos contain many toxic substances. These are what make you sick from smoking.  Nicotine - The main thing that makes you addicted to smoking  Carbon monoxide - A poison that gets into your blood when smoking  Tar - Has chemicals that are cancerous  Lead and many others  These factors affect how much damage smoking will have on you:  How much you smoke  What you smoke  How what you smoke has been prepared  The content of what you smoke  Smoking hurts every part of the body. The lungs and the heart are most often affected by tobacco use.  General   Smoking is very harmful to your body. It can cause many illnesses and can affect how you look.  Smoking and Cancer   Smoking is the most common cause of lung cancer. Smoking may also increase the risk of:  Mouth cancer. This can also be caused by chewing tobacco.  Bladder cancer  Cancer of the tube from the mouth to stomach. This is also called the esophagus.  Cancer of the throat. This can also be caused by chewing tobacco.  Kidney cancer  Liver cancer  Stomach, colon, and rectal cancer  Cancer of the pancreas  Cancer of the cervix in women  Cancer of the blood or leukemia  Skin cancer  Smoking and Your Lungs   If you smoke you are more likely to have:  Breathing problems  Lung infections like pneumonia or bronchitis  Lung disease such as COPD or emphysema  Asthma  Smoking and the Heart and Circulation   Causes heart disease and stroke  Smokers are at a higher risk for high blood pressure  You are more likely to get blood clots  Smoking can lower blood flow to the legs and skin  Smoking and Diabetes   If you smoke, you:  Have a higher risk of developing diabetes  May have higher blood sugar levels  May have more problems with your  diabetes  Smoking and Digestion   Smokers make more stomach acid. This can cause sores or ulcers in your belly or bowel.  Your stomach acids may flow back to your esophagus. This is also called heartburn.  You have more chance of bowel irritation and swelling  Smoking and Your Bones   If you smoke:  Your bone-forming cells don't grow as fast  Your bones may become weaker (osteoporosis)  You may have more low back pain and arthritis  You may have more overuse injuries  You may have a greater risk of breaking bones  Your broken bones may take longer to heal  Smoking and Pregnancy   Makes your baby more prone to problems  You have a higher chance of having a premature baby or baby born early  Your healthy baby may die without reason. This is called sudden infant death syndrome.  Your baby may be born dead (stillbirth)  Your baby may have a low birth weight  You have a higher risk of an ectopic pregnancy or a pregnancy outside of the uterus  You have a higher chance of having a baby with mouth or facial defects  Smoking and Your Eyes, Hair, Hands, and Mouth   If you smoke, you may notice your:  Eyes become cloudy and form cataracts  Hair may get thin and turn gray sooner than it should  Fingernails turn yellow  Teeth become yellowish brown  Gums have more problems  Teeth have problems or even become loose  Sense of taste and smell start to go away  Breath smells bad  Smoking and Skin   Smoking causes:  Less blood flow to the skin  Wrinkles start early around your eyes and mouth  Dry skin  Your skin to lose elasticity and strength  Skin may get splotchy or pale  Your face to look thin and old. This is called smoker's face.  Greater risk of getting a skin problem called psoriasis  Slower healing of cuts or bruises  Smoking and Sex Life   If you smoke you are more likely to have problems like:  Impotence  Decreased blood flow to the penis. This is also called erectile dysfunction.  Trouble getting pregnant  Early change of  life or menopause for women  A higher risk of heart attack or stroke for women who smoke and use birth control pills  Damaged sperm that may lead to problems getting a woman pregnant, birth defects, or miscarriage  Smoking and Your Brain and Behavior   Smoking can:  Affect your mood  Lead to addiction  Cause long-term confusion or damage to your brain cells  Cause low mood  Smoking and Children   If you smoke, your children:  Will be more likely to smoke.  Can be sick from being around your smoke. This is called secondhand smoke.  Need to learn about the bad effects of smoking. Most smokers start before the age of 18. Encourage your children never to smoke.  Smoking and Other Effects   Smoking can cause:  Wounds to take longer to heal  You to eat poorly  Weight loss  Swelling in the body and affect the body's immune or defense system  Rheumatoid arthritis  Greater chance of injury  You to get warts easier (human papillomavirus)  A bad odor in hair and on clothes  You to have poor sports performance  You to spend a lot of money. Smoking is an expensive habit. A smoker who smokes a pack per day in the [place] will spend over $2000 per year on cigarettes.  Health care to cost more  You to miss work more often  Hearing loss  Even if you have smoked for many years, it is not too late to quit. Your body starts to heal as soon as you stop smoking. It is better to quit when you are young, but you can still get healthier if you quit at any age.       Helpful tips   Some helpful steps you can take to help you quit smoking:  Set a date to quit smoking.  Know the reasons that make you smoke more.  Know why you want to quit smoking.  Write down each time you smoke. Include the time and what you are doing. Plan ahead about what you will do instead of smoking when that time or event reappears.  Tell your family and friends about your plan to quit smoking. Let them know how to help you.  Slowly reduce your smoking.  Remove smoking and  tobacco products from your home and other places.  Avoid places and situations where you will more likely smoke. If people close to you smoke, ask them to quit with you. If they do not quit, ask them to not smoke around you.  Reward or treat yourself every time you do not smoke. Do not use food as a reward.  Ask a doctor for help.  Talk with your doctor before you try an electronic cigarette.  Where can I learn more?   Centers for Disease Control and Prevention  http://www.cdc.gov/tobacco/data_statistics/fact_sheets/health_effects/effects_cig_smoking/#overview   Centers for Disease Control and Prevention  https://www.cdc.gov/tobacco/campaign/tips/quit-smoking/guide/quit-plan.html?s_cid=OSH_tips_D9400   KidsHealth  http://kidshealth.org/teen/drug_alcohol/tobacco/smoking.html#   US Food and Drug Administration  https://www.fda.gov/TobaccoProducts/Labeling/ProductsIngredientsComponents/default.htm   Last Reviewed Date   2021-03-31  Consumer Information Use and Disclaimer   This information is not specific medical advice and does not replace information you receive from your health care provider. This is only a brief summary of general information. It does NOT include all information about conditions, illnesses, injuries, tests, procedures, treatments, therapies, discharge instructions or life-style choices that may apply to you. You must talk with your health care provider for complete information about your health and treatment options. This information should not be used to decide whether or not to accept your health care providers advice, instructions or recommendations. Only your health care provider has the knowledge and training to provide advice that is right for you.  Copyright   Copyright © 2021 UpToDate, Inc. and its affiliates and/or licensors. All rights reserved.

## 2024-06-13 NOTE — PROGRESS NOTES
Subjective     Patient ID: Ro Hitchcock is a 54 y.o. female.    Chief Complaint: Healthy You ([employer] Healthy You), Did not bring medications (Verbally reviewed.), Nicotine Dependence (She would like to discuss smoking cessation options today.), and Health Maintenance (She would like to discuss getting her screening mammogram and colonoscopy done.)    HPI  Review of Systems   Constitutional:  Negative for activity change, appetite change, chills, fatigue and fever.   HENT:  Negative for nasal congestion, ear pain, hearing loss, postnasal drip and sore throat.    Respiratory:  Negative for cough, chest tightness, shortness of breath and wheezing.    Cardiovascular:  Negative for chest pain, palpitations, leg swelling and claudication.   Gastrointestinal:  Negative for abdominal pain, change in bowel habit, constipation, diarrhea, nausea and vomiting.   Genitourinary:  Negative for dysuria.   Musculoskeletal:  Negative for arthralgias, back pain, gait problem and myalgias.   Integumentary:  Negative for rash.   Neurological:  Negative for weakness and headaches.   Psychiatric/Behavioral:  Negative for suicidal ideas. The patient is not nervous/anxious.        Tobacco Use: High Risk (6/13/2024)    Patient History     Smoking Tobacco Use: Every Day     Smokeless Tobacco Use: Never     Passive Exposure: Never     Review of patient's allergies indicates:  No Known Allergies  Current Outpatient Medications   Medication Instructions    aspirin (ECOTRIN) 81 MG EC tablet 1 tablet, Oral, Every morning    clopidogreL (PLAVIX) 75 mg, Oral, Daily    losartan (COZAAR) 50 mg, Oral, Daily    rosuvastatin (CRESTOR) 40 mg, Oral, Nightly    sertraline (ZOLOFT) 50 mg, Oral, Daily     Medications Discontinued During This Encounter   Medication Reason    losartan (COZAAR) 25 MG tablet        Past Medical History:   Diagnosis Date    Coronary artery disease     stent placed 9/19/22    GERD (gastroesophageal reflux disease)     Hyperlipidemia   "   Hypertension      Health Maintenance Topics with due status: Not Due       Topic Last Completion Date    Influenza Vaccine 09/19/2022    Lipid Panel 09/19/2022    High Dose Statin 06/13/2024    Aspirin/Antiplatelet Therapy 06/13/2024     Immunization History   Administered Date(s) Administered    Influenza - Quadrivalent - PF *Preferred* (6 months and older) 09/19/2022    Pneumococcal Conjugate - 20 Valent 06/13/2024       Objective     Body mass index is 39.94 kg/m².  Wt Readings from Last 3 Encounters:   06/13/24 108.9 kg (240 lb)   06/03/24 108.5 kg (239 lb 3.2 oz)   02/02/23 106.5 kg (234 lb 12.8 oz)     Ht Readings from Last 3 Encounters:   06/13/24 5' 5" (1.651 m)   06/03/24 5' 5" (1.651 m)   02/02/23 5' 5" (1.651 m)     BP Readings from Last 3 Encounters:   06/13/24 112/75   06/03/24 133/81   02/02/23 139/83     Temp Readings from Last 3 Encounters:   06/13/24 98.1 °F (36.7 °C) (Oral)   06/03/24 98 °F (36.7 °C) (Oral)   02/02/23 99.9 °F (37.7 °C) (Oral)     Pulse Readings from Last 3 Encounters:   06/13/24 72   06/03/24 78   02/02/23 89     Resp Readings from Last 3 Encounters:   06/13/24 18   06/03/24 16   02/02/23 16     PF Readings from Last 3 Encounters:   No data found for PF       Physical Exam  Vitals and nursing note reviewed.   Constitutional:       General: She is not in acute distress.     Appearance: Normal appearance. She is not ill-appearing.   Eyes:      Extraocular Movements: Extraocular movements intact.      Pupils: Pupils are equal, round, and reactive to light.   Cardiovascular:      Rate and Rhythm: Normal rate and regular rhythm.      Heart sounds: Normal heart sounds.   Pulmonary:      Effort: Pulmonary effort is normal.      Breath sounds: Normal breath sounds.   Abdominal:      General: Bowel sounds are normal.      Palpations: Abdomen is soft.   Musculoskeletal:         General: Normal range of motion.   Skin:     Findings: No rash.   Neurological:      General: No focal deficit " present.      Mental Status: She is alert and oriented to person, place, and time. Mental status is at baseline.   Psychiatric:         Mood and Affect: Mood normal.         Behavior: Behavior normal.         Assessment and Plan     Problem List Items Addressed This Visit          Endocrine    Class 2 severe obesity due to excess calories with serious comorbidity and body mass index (BMI) of 39.0 to 39.9 in adult (Chronic)     Other Visit Diagnoses       Encounter for general adult medical examination with abnormal findings    -  Primary    Screening for diabetes mellitus        Relevant Orders    Glucose, Random    Screening for lipoid disorders        Relevant Orders    Lipid Panel    Screening for malignant neoplasm of colon        Relevant Orders    Colonoscopy    Screening mammogram for breast cancer        Relevant Orders    Mammo Digital Screening Bilat    Immunization due        Relevant Orders    (In Office Administered) Pneumococcal Conjugate Vaccine (20 Valent) (IM) (Preferred) (Completed)            Plan:       I have reviewed the medications, allergies, and problem list.     Goal Actions:    What type of visit is the patient here for today?: Healthy You  Does the patient consent to enroll in Color Me Healthy?: Yes  Is this a Wellness Follow Up?: No  What is your overall wellness goal? (select at least one): Lifestyle modifications, Lose weight, Understand disease process, Improve overall health  Choose 3: Biometric, Lifestyle, Nutrition, Exercise, Tobacco  Biometric Actions: Attend regularly scheduled office visits, Monitor and record \report B\P log, Monitor, record and report glucose levels, LDL Chol<100, SBP<120 and DBP<80, BMI>30 lose 1-2 lbs per week  Lifestyle Actions : Obtain yearly mammogram, Schedule colonoscopy, sigmoidoscopy, or Colorguard if applicable, BMD if applicable, Pap smear or HPV, Develop good support system, Take medications as prescribed  Nurtrition Actions: Avoid adding table  salt, Read nutrition labels, Recommend weight loss, Eat a well-balanced diet, Eat heart healthy diet, adhere to a specific caloric diet, Avoid carbonated beverages, Decrease intake of fast food, DASH diet  Exercise Actions: Recommend physical activity 30 minutes per day 3-5 times/week  Tobacco Actions: Avoid all tobacco products including 2nd and 3rd hand exposure, Refer for tobacco free if part of plan, Patient will not stop using tobacco recommend reducing amt of consumption per day

## 2024-06-14 ENCOUNTER — PATIENT OUTREACH (OUTPATIENT)
Facility: HOSPITAL | Age: 54
End: 2024-06-14
Payer: COMMERCIAL

## 2024-06-14 DIAGNOSIS — Z12.11 ENCOUNTER FOR SCREENING COLONOSCOPY: Primary | ICD-10-CM

## 2024-06-14 NOTE — PROGRESS NOTES
Population Health Chart Review & Patient Outreach Details      Further Action Needed If Patient Returns Outreach:            Updates Requested / Reviewed:     []  Care Everywhere    []     []  External Sources ([facility], [facility], [facility], etc.)    [] [facility]   [] [facility]   [] Other:    []  Care Team Updated   []  Removed  or Duplicate Orders   []  Immunization Reconciliation Completed / Queried    [] [place]   [] [place]   [] [place]   [] [place]      Health Maintenance Topics Addressed and Outreach Outcomes / Actions Taken:             Breast Cancer Screening []  Mammogram Order Placed    []  Mammogram Screening Scheduled    []  External Records Requested & Care Team Updated if Applicable    []  External Records Uploaded & Care Team Updated if Applicable    []  Pt Declined Scheduling Mammogram    []  Pt Will Schedule with External Provider / Order Routed & Care Team Updated if Applicable              Cervical Cancer Screening []  Pap Smear Scheduled in Primary Care or OBGYN    []  External Records Requested & Care Team Updated if Applicable       []  External Records Uploaded, Care Team Updated, & History Updated if Applicable    []  Patient Declined Scheduling Pap Smear    []  Patient Will Schedule with External Provider & Care Team Updated if Applicable                  Colorectal Cancer Screening []  Colonoscopy Case Request / Referral / Home Test Order Placed    []  External Records Requested & Care Team Updated if Applicable    []  External Records Uploaded, Care Team Updated, & History Updated if Applicable    []  Patient Declined Completing Colon Cancer Screening    []  Patient Will Schedule with External Provider & Care Team Updated if Applicable    []  Fit Kit Mailed (add the SmartPhrase under additional notes)    []  Reminded Patient to Complete Home Test                Diabetic Eye Exam []  Eye Exam Screening Order Placed    []  Eye Camera Scheduled or Optometry/Ophthalmology Referral  Placed    []  External Records Requested & Care Team Updated if Applicable    []  External Records Uploaded, Care Team Updated, & History Updated if Applicable    []  Patient Declined Scheduling Eye Exam    []  Patient Will Schedule with External Provider & Care Team Updated if Applicable             Blood Pressure Control []  Primary Care Follow Up Visit Scheduled     []  Remote Blood Pressure Reading Captured    []  Patient Declined Remote Reading or Scheduling Appt - Escalated to PCP    []  Patient Will Call Back or Send Portal Message with Reading                 HbA1c & Other Labs []  Overdue Lab(s) Ordered    []  Overdue Lab(s) Scheduled    []  External Records Uploaded & Care Team Updated if Applicable    []  Primary Care Follow Up Visit Scheduled     []  Reminded Patient to Complete A1c Home Test    []  Patient Declined Scheduling Labs or Will Call Back to Schedule    []  Patient Will Schedule with External Provider / Order Routed, & Care Team Updated if Applicable           Primary Care Appointment []  Primary Care Appt Scheduled    []  Patient Declined Scheduling or Will Call Back to Schedule    []  Pt Established with External Provider, Updated Care Team, & Informed Pt to Notify Payor if Applicable           Medication Adherence /    Statin Use []  Primary Care Appointment Scheduled    []  Patient Reminded to  Prescription    []  Patient Declined, Provider Notified if Needed    []  Sent Provider Message to Review to Evaluate Pt for Statin, Add Exclusion Dx Codes, Document   Exclusion in Problem List, Change Statin Intensity Level to Moderate or High Intensity if Applicable                Osteoporosis Screening []  Dexa Order Placed    []  Dexa Appointment Scheduled    []  External Records Requested & Care Team Updated    []  External Records Uploaded, Care Team Updated, & History Updated if Applicable    []  Patient Declined Scheduling Dexa or Will Call Back to Schedule    []  Patient Will Schedule  with External Provider / Order Routed & Care Team Updated if Applicable       Additional Notes:.  Post visit Population Health review of encounter with date of service  6/13/24 with Naldo.  All required HY components in encounter.    Followup appt for: 6/12/25HY

## 2024-06-15 RX ORDER — POLYETHYLENE GLYCOL 3350, SODIUM SULFATE ANHYDROUS, SODIUM BICARBONATE, SODIUM CHLORIDE, POTASSIUM CHLORIDE 236; 22.74; 6.74; 5.86; 2.97 G/4L; G/4L; G/4L; G/4L; G/4L
4 POWDER, FOR SOLUTION ORAL ONCE
Qty: 4000 ML | Refills: 0 | Status: SHIPPED | OUTPATIENT
Start: 2024-06-15 | End: 2024-06-15

## 2024-06-19 ENCOUNTER — OFFICE VISIT (OUTPATIENT)
Dept: FAMILY MEDICINE | Facility: CLINIC | Age: 54
End: 2024-06-19
Payer: COMMERCIAL

## 2024-06-19 VITALS
SYSTOLIC BLOOD PRESSURE: 131 MMHG | RESPIRATION RATE: 18 BRPM | OXYGEN SATURATION: 100 % | WEIGHT: 235 LBS | HEIGHT: 65 IN | DIASTOLIC BLOOD PRESSURE: 80 MMHG | BODY MASS INDEX: 39.15 KG/M2 | TEMPERATURE: 98 F | HEART RATE: 60 BPM

## 2024-06-19 DIAGNOSIS — K05.219 GUM ABSCESS: ICD-10-CM

## 2024-06-19 DIAGNOSIS — K12.1 ULCER MOUTH: Primary | ICD-10-CM

## 2024-06-19 PROCEDURE — 3079F DIAST BP 80-89 MM HG: CPT | Mod: ,,, | Performed by: FAMILY MEDICINE

## 2024-06-19 PROCEDURE — 1160F RVW MEDS BY RX/DR IN RCRD: CPT | Mod: ,,, | Performed by: FAMILY MEDICINE

## 2024-06-19 PROCEDURE — 1159F MED LIST DOCD IN RCRD: CPT | Mod: ,,, | Performed by: FAMILY MEDICINE

## 2024-06-19 PROCEDURE — 3075F SYST BP GE 130 - 139MM HG: CPT | Mod: ,,, | Performed by: FAMILY MEDICINE

## 2024-06-19 PROCEDURE — 3008F BODY MASS INDEX DOCD: CPT | Mod: ,,, | Performed by: FAMILY MEDICINE

## 2024-06-19 PROCEDURE — 4010F ACE/ARB THERAPY RXD/TAKEN: CPT | Mod: ,,, | Performed by: FAMILY MEDICINE

## 2024-06-19 PROCEDURE — 99213 OFFICE O/P EST LOW 20 MIN: CPT | Mod: ,,, | Performed by: FAMILY MEDICINE

## 2024-06-19 RX ORDER — POLYETHYLENE GLYCOL-3350 AND ELECTROLYTES 236; 6.74; 5.86; 2.97; 22.74 G/274.31G; G/274.31G; G/274.31G; G/274.31G; G/274.31G
240 POWDER, FOR SOLUTION ORAL ONCE
COMMUNITY
Start: 2024-06-15

## 2024-06-19 RX ORDER — TRIAMCINOLONE ACETONIDE 1 MG/G
PASTE DENTAL
Qty: 5 G | Refills: 1 | Status: SHIPPED | OUTPATIENT
Start: 2024-06-19

## 2024-06-19 RX ORDER — AMOXICILLIN 875 MG/1
875 TABLET, FILM COATED ORAL EVERY 12 HOURS
Qty: 20 TABLET | Refills: 0 | Status: SHIPPED | OUTPATIENT
Start: 2024-06-19

## 2024-06-19 NOTE — PROGRESS NOTES
New Clinic Note    Ro Hitchcock is a 54 y.o. female     CC:   Chief Complaint   Patient presents with    Sleep Bruxism     Patient complains of clenching her teeth at night and has full upper denture plate and when she removed it her lower teeth grind into her upper gums and caused mouth problems and her face and upper lip is swollen. She is off from work today for holiday,works at [employer].         Subjective    History of Present Illness HPI   Patient complains of swelling in her right upper lip. She has dentures and she said they do not fit right. It is causing sores in her upper right gum and lip. She has not taken anything for her symptoms. She denies fever.     Current Outpatient Medications:     aspirin (ECOTRIN) 81 MG EC tablet, Take 1 tablet by mouth every morning., Disp: , Rfl:     clopidogreL (PLAVIX) 75 mg tablet, Take 75 mg by mouth once daily., Disp: , Rfl:     losartan (COZAAR) 50 MG tablet, Take 50 mg by mouth once daily., Disp: , Rfl:     rosuvastatin (CRESTOR) 40 MG Tab, Take 40 mg by mouth every evening., Disp: , Rfl:     sertraline (ZOLOFT) 50 MG tablet, Take 50 mg by mouth once daily., Disp: , Rfl:     amoxicillin (AMOXIL) 875 MG tablet, Take 1 tablet (875 mg total) by mouth every 12 (twelve) hours., Disp: 20 tablet, Rfl: 0    GAVILYTE-G 236-22.74-6.74 -5.86 gram suspension, Take 240 mLs by mouth once., Disp: , Rfl:     triamcinolone acetonide 0.1% (KENALOG) 0.1 % paste, Place on ulcer twice a day., Disp: 5 g, Rfl: 1     Past Medical History:   Diagnosis Date    Coronary artery disease     stent placed 9/19/22    GERD (gastroesophageal reflux disease)     Hyperlipidemia     Hypertension         Family History   Problem Relation Name Age of Onset    Hypertension Mother      Diabetes Father      Cancer Sister      Hypertension Sister      Diabetes Sister          Past Surgical History:   Procedure Laterality Date    CHOLECYSTECTOMY          Review of Systems   Constitutional:  Negative for  "fatigue and fever.   HENT:  Positive for mouth sores. Negative for ear pain, postnasal drip, rhinorrhea and sinus pressure/congestion.    Respiratory:  Negative for cough and shortness of breath.    Cardiovascular:  Negative for chest pain.   Gastrointestinal:  Negative for abdominal pain, diarrhea, nausea and vomiting.   Genitourinary:  Negative for dysuria.   Neurological:  Negative for headaches.        /80 (BP Location: Left arm, Patient Position: Sitting, BP Method: Large (Automatic))   Pulse 60   Temp 97.7 °F (36.5 °C) (Oral)   Resp 18   Ht 5' 5" (1.651 m)   Wt 106.6 kg (235 lb)   SpO2 100%   BMI 39.11 kg/m²      Physical Exam  HENT:      Head: Normocephalic and atraumatic.      Mouth/Throat:      Dentition: Gingival swelling and gum lesions present.      Comments: Patient has an ulcer on her upper right gum and on her inner lip. Some swelling noted in right upper lip from possible secondary infection.   Cardiovascular:      Rate and Rhythm: Normal rate and regular rhythm.   Pulmonary:      Effort: Pulmonary effort is normal.      Breath sounds: Normal breath sounds.   Neurological:      Mental Status: She is alert and oriented to person, place, and time.   Psychiatric:         Mood and Affect: Mood normal.         Behavior: Behavior normal.          Assessment and Plan      ICD-10-CM ICD-9-CM   1. Ulcer mouth  K12.1 528.9   2. Gum abscess  K05.219 523.30        1. Ulcer mouth  -     triamcinolone acetonide 0.1% (KENALOG) 0.1 % paste; Place on ulcer twice a day.  Dispense: 5 g; Refill: 1    2. Gum abscess  Will treat with amoxicillin. Follow up with dentist.   -     amoxicillin (AMOXIL) 875 MG tablet; Take 1 tablet (875 mg total) by mouth every 12 (twelve) hours.  Dispense: 20 tablet; Refill: 0       Follow up if symptoms worsen or fail to improve.       "

## 2024-08-07 LAB — BCS RECOMMENDATION EXT: NORMAL

## 2024-08-12 ENCOUNTER — PATIENT OUTREACH (OUTPATIENT)
Facility: HOSPITAL | Age: 54
End: 2024-08-12
Payer: COMMERCIAL

## 2024-08-12 NOTE — PROGRESS NOTES
Population Health Chart Review & Patient Outreach Details    Updates Requested / Reviewed:  [x]  Care Team Updated    Health Maintenance Topics Addressed and Outreach Outcomes / Actions Taken:  Breast Cancer Screening [x]  Updated with August 2024 Mammogram ([facility]). History Updated.

## 2024-11-19 ENCOUNTER — OFFICE VISIT (OUTPATIENT)
Dept: FAMILY MEDICINE | Facility: CLINIC | Age: 54
End: 2024-11-19
Payer: COMMERCIAL

## 2024-11-19 VITALS
RESPIRATION RATE: 18 BRPM | SYSTOLIC BLOOD PRESSURE: 129 MMHG | BODY MASS INDEX: 40.32 KG/M2 | WEIGHT: 242 LBS | DIASTOLIC BLOOD PRESSURE: 79 MMHG | HEIGHT: 65 IN | TEMPERATURE: 98 F | HEART RATE: 67 BPM | OXYGEN SATURATION: 98 %

## 2024-11-19 DIAGNOSIS — H10.32 ACUTE BACTERIAL CONJUNCTIVITIS OF LEFT EYE: Primary | ICD-10-CM

## 2024-11-19 PROCEDURE — 1159F MED LIST DOCD IN RCRD: CPT | Mod: ,,, | Performed by: NURSE PRACTITIONER

## 2024-11-19 PROCEDURE — 3074F SYST BP LT 130 MM HG: CPT | Mod: ,,, | Performed by: NURSE PRACTITIONER

## 2024-11-19 PROCEDURE — 99214 OFFICE O/P EST MOD 30 MIN: CPT | Mod: ,,, | Performed by: NURSE PRACTITIONER

## 2024-11-19 PROCEDURE — 4010F ACE/ARB THERAPY RXD/TAKEN: CPT | Mod: ,,, | Performed by: NURSE PRACTITIONER

## 2024-11-19 PROCEDURE — 3078F DIAST BP <80 MM HG: CPT | Mod: ,,, | Performed by: NURSE PRACTITIONER

## 2024-11-19 PROCEDURE — 1160F RVW MEDS BY RX/DR IN RCRD: CPT | Mod: ,,, | Performed by: NURSE PRACTITIONER

## 2024-11-19 PROCEDURE — 3008F BODY MASS INDEX DOCD: CPT | Mod: ,,, | Performed by: NURSE PRACTITIONER

## 2024-11-19 RX ORDER — ERYTHROMYCIN 5 MG/G
OINTMENT OPHTHALMIC 3 TIMES DAILY
Qty: 1 G | Refills: 0 | Status: SHIPPED | OUTPATIENT
Start: 2024-11-19

## 2024-11-19 NOTE — LETTER
November 19, 2024      [facility]  [address] [place] [address]  Phone: [phone]  Fax: [phone]       Patient: Ro Hitchcock   YOB: 1970  Date of Visit: 11/19/2024    To Whom It May Concern:    Becky Hitchcock  was at [facility] on 11/19/2024. The patient may return to work/school on 11/20/24 with no restrictions. If you have any questions or concerns, or if I can be of further assistance, please do not hesitate to contact me.    Sincerely,    ED Carvajal DNP, FNP-C

## 2024-11-19 NOTE — PROGRESS NOTES
"   Gabriela Espino DNP, ABDULAZIZ    [address] [place] [address]     PATIENT NAME: Ro Hitchcock  : 1970  DATE: 24  MRN: [id number]      Billing Provider: Gabriela Espino DNP, FNP  Level of Service:   Patient PCP Information       Provider PCP Type    Varun Abreu MD General            Reason for Visit / Chief Complaint: Eye Drainage (Complains of redness and drainage in her left eye. Also complains of it being "itchy." Started this morning.)       Update PCP  Update Chief Complaint         History of Present Illness / Problem Focused Workflow     Ro Hitchcock presents to the clinic with Eye Drainage (Complains of redness and drainage in her left eye. Also complains of it being "itchy." Started this morning.)         Review of Systems     Review of Systems   Constitutional:  Negative for activity change, appetite change, chills, fatigue and fever.   HENT:  Negative for nasal congestion, ear pain, hearing loss, postnasal drip and sore throat.    Eyes:  Positive for photophobia, discharge, redness and itching.   Respiratory:  Negative for cough, chest tightness, shortness of breath and wheezing.    Cardiovascular:  Negative for chest pain, palpitations, leg swelling and claudication.   Gastrointestinal:  Negative for abdominal pain, change in bowel habit, constipation, diarrhea, nausea and vomiting.   Genitourinary:  Negative for dysuria.   Musculoskeletal:  Negative for arthralgias, back pain, gait problem and myalgias.   Integumentary:  Negative for rash.   Neurological:  Negative for weakness and headaches.   Psychiatric/Behavioral:  Negative for suicidal ideas. The patient is not nervous/anxious.         Medical / Social / Family History     Past Medical History:   Diagnosis Date    Breast cancer screening by mammogram 2024    [facility]    Coronary artery disease     stent placed 22    GERD (gastroesophageal reflux disease)     " "Hyperlipidemia     Hypertension        Past Surgical History:   Procedure Laterality Date    CHOLECYSTECTOMY         Social History  Ms. Ro Hitchcock  reports that she has been smoking cigarettes. She started smoking about 36 years ago. She has a 9.2 pack-year smoking history. She has never been exposed to tobacco smoke. She has never used smokeless tobacco. She reports that she does not currently use alcohol. She reports that she does not use drugs.    Family History  Ms. Ro Hitchcock's family history includes Cancer in her sister; Diabetes in her father and sister; Hypertension in her mother and sister.    Medications and Allergies     Medications  Outpatient Medications Marked as Taking for the 11/19/24 encounter (Office Visit) with Gabriela Espino, DONNY, FNP   Medication Sig Dispense Refill    aspirin (ECOTRIN) 81 MG EC tablet Take 1 tablet by mouth every morning.      clopidogreL (PLAVIX) 75 mg tablet Take 75 mg by mouth once daily.      losartan (COZAAR) 50 MG tablet Take 50 mg by mouth once daily.      rosuvastatin (CRESTOR) 40 MG Tab Take 40 mg by mouth every evening.      sertraline (ZOLOFT) 50 MG tablet Take 50 mg by mouth once daily.      triamcinolone acetonide 0.1% (KENALOG) 0.1 % paste Place on ulcer twice a day. 5 g 1       Allergies  Review of patient's allergies indicates:  No Known Allergies    Physical Examination     Vitals:    11/19/24 0822   BP: 129/79   BP Location: Left arm   Patient Position: Sitting   Pulse: 67   Resp: 18   Temp: 98.2 °F (36.8 °C)   TempSrc: Oral   SpO2: 98%   Weight: 109.8 kg (242 lb)   Height: 5' 5" (1.651 m)     Physical Exam  Vitals and nursing note reviewed.   Constitutional:       General: She is not in acute distress.     Appearance: Normal appearance. She is not ill-appearing.   HENT:      Nose: Nose normal.      Mouth/Throat:      Mouth: Mucous membranes are moist.   Eyes:      General: Lids are normal.         Left eye: Discharge present.     Extraocular Movements: " Extraocular movements intact.      Pupils: Pupils are equal, round, and reactive to light.      Slit lamp exam:     Left eye: Photophobia present.   Cardiovascular:      Rate and Rhythm: Normal rate and regular rhythm.      Pulses: Normal pulses.      Heart sounds: Normal heart sounds. No murmur heard.  Pulmonary:      Effort: Pulmonary effort is normal. No respiratory distress.      Breath sounds: Normal breath sounds. No wheezing, rhonchi or rales.   Chest:      Chest wall: No tenderness.   Abdominal:      General: Bowel sounds are normal.      Palpations: Abdomen is soft.   Musculoskeletal:         General: Normal range of motion.      Cervical back: Normal range of motion and neck supple.      Right lower leg: No edema.      Left lower leg: No edema.   Skin:     General: Skin is warm and dry.      Findings: No rash.   Neurological:      General: No focal deficit present.      Mental Status: She is alert and oriented to person, place, and time. Mental status is at baseline.   Psychiatric:         Mood and Affect: Mood normal.         Behavior: Behavior normal.          Assessment and Plan (including Health Maintenance)      Problem List  Smart Sets  Document Outside HM   :    Plan:         Health Maintenance Due   Topic Date Due    Hepatitis C Screening  Never done    Cervical Cancer Screening  Never done    HIV Screening  Never done    TETANUS VACCINE  Never done    Hemoglobin A1c (Diabetic Prevention Screening)  Never done    Colorectal Cancer Screening  Never done    Shingles Vaccine (1 of 2) Never done    COVID-19 Vaccine (1 - 2024-25 season) Never done       Problem List Items Addressed This Visit    None  Visit Diagnoses       Acute bacterial conjunctivitis of left eye    -  Primary    Relevant Medications    erythromycin (ROMYCIN) ophthalmic ointment          Acute bacterial conjunctivitis of left eye  -     erythromycin (ROMYCIN) ophthalmic ointment; Place into the left eye 3 (three) times daily.   Dispense: 1 g; Refill: 0       Health Maintenance Topics with due status: Not Due       Topic Last Completion Date    Lipid Panel 06/13/2024    Mammogram 08/07/2024    High Dose Statin 11/19/2024    RSV Vaccine (Age 60+ and Pregnant patients) Not Due         Future Appointments   Date Time Provider Department Center   11/19/2024  9:40 AM Gabriela Espino DNP, FNP [facility]   12/13/2024 12:30 [facility]   1/8/2025 10:00 AM [facility] GI ROOM 01 [facility]   6/12/2025 12:45 PM [facility]        Follow up if symptoms worsen or fail to improve.     Signature:  Gabriela Espino DNP [facility]  [address] [place] [address]  Phone #: [phone]  Fax #: [phone]    Date of encounter: 11/19/24    Patient Instructions   Warm compresses. Good handwashing. Use eye ointment. If no improvement in 2 days, follow up with eye doctor.

## 2024-11-19 NOTE — PATIENT INSTRUCTIONS
Warm compresses. Good handwashing. Use eye ointment. If no improvement in 2 days, follow up with eye doctor.

## 2024-12-10 ENCOUNTER — PATIENT OUTREACH (OUTPATIENT)
Facility: HOSPITAL | Age: 54
End: 2024-12-10
Payer: COMMERCIAL

## 2024-12-10 NOTE — PROGRESS NOTES
Population Health Chart Review & Patient Outreach Details  Per [facility] website, insurance is active and patient is enrolled in CM:    CM! Provider CMH! Benefit Start Date CMH! Benefit End Date Baseline Risk Track(s) Baseline Risk Level Current Risk Tracks(s) Current Risk Level   SAMANTHA KOO MD 2024 Hypertension, Cholesterol Moderate Hypertension, Cholesterol Moderate               The Color Me Healthy! biometrics entry should be used only for biometrics associated with Color Me Healthy! services.     Color Me Healthy! biometrics must be submitted by a [employer] Provider. Please enter the [employer] Provider's NPI to submit biometrics.  Provider Search   Performing Provider NPI:               The services below are available to the member under Color Me Healthy! when performed by a [employer] Provider.  Benefit accumulators are based on Color Me Healthy! benefit periods.  WeMonitor Healthy! Services Guide   Color Me Healthy! Services  CPT Codes     Lipid Profile  (0 of 2 Completed) View CPT Codes »    Metabolic Visit  (0 of 2 Completed) View CPT Codes »    Microalbumin  (0 of 1 Completed) View CPT Codes »    Medication Monitoring-Renal  (0 of 2 Completed) View CPT Codes »    Medication Monitoring-Liver  (0 of 2 Completed) View CPT Codes »    Dilated Eye Exam  (0 of 1 Completed) View CPT Codes »    Venipuncture  (0 of 2 Completed) View CPT Codes »             Further Action Needed If Patient Returns Outreach:            Updates Requested / Reviewed:     []  Care Everywhere    []     []  External Sources (LabCorp, Quest, DIS, etc.)    [] [facility]   [] Quest   [] Other:    []  Care Team Updated   []  Removed  or Duplicate Orders   []  Immunization Reconciliation Completed / Queried    [] [place]   [] [place]   [] [place]   [] [place]      Health Maintenance Topics Addressed and Outreach Outcomes / Actions Taken:             Breast  Cancer Screening []  Mammogram Order Placed    []  Mammogram Screening Scheduled    []  External Records Requested & Care Team Updated if Applicable    []  External Records Uploaded & Care Team Updated if Applicable    []  Pt Declined Scheduling Mammogram    []  Pt Will Schedule with External Provider / Order Routed & Care Team Updated if Applicable              Cervical Cancer Screening []  Pap Smear Scheduled in Primary Care or OBGYN    []  External Records Requested & Care Team Updated if Applicable       []  External Records Uploaded, Care Team Updated, & History Updated if Applicable    []  Patient Declined Scheduling Pap Smear    []  Patient Will Schedule with External Provider & Care Team Updated if Applicable                  Colorectal Cancer Screening []  Colonoscopy Case Request / Referral / Home Test Order Placed    []  External Records Requested & Care Team Updated if Applicable    []  External Records Uploaded, Care Team Updated, & History Updated if Applicable    []  Patient Declined Completing Colon Cancer Screening    []  Patient Will Schedule with External Provider & Care Team Updated if Applicable    []  Fit Kit Mailed (add the SmartPhrase under additional notes)    []  Reminded Patient to Complete Home Test                Diabetic Eye Exam []  Eye Exam Screening Order Placed    []  Eye Camera Scheduled or Optometry/Ophthalmology Referral Placed    []  External Records Requested & Care Team Updated if Applicable    []  External Records Uploaded, Care Team Updated, & History Updated if Applicable    []  Patient Declined Scheduling Eye Exam    []  Patient Will Schedule with External Provider & Care Team Updated if Applicable             Blood Pressure Control []  Primary Care Follow Up Visit Scheduled     []  Remote Blood Pressure Reading Captured    []  Patient Declined Remote Reading or Scheduling Appt - Escalated to PCP    []  Patient Will Call Back or Send Portal Message with Reading                  HbA1c & Other Labs []  Overdue Lab(s) Ordered    []  Overdue Lab(s) Scheduled    []  External Records Uploaded & Care Team Updated if Applicable    []  Primary Care Follow Up Visit Scheduled     []  Reminded Patient to Complete A1c Home Test    []  Patient Declined Scheduling Labs or Will Call Back to Schedule    []  Patient Will Schedule with External Provider / Order Routed, & Care Team Updated if Applicable           Primary Care Appointment []  Primary Care Appt Scheduled    []  Patient Declined Scheduling or Will Call Back to Schedule    []  Pt Established with External Provider, Updated Care Team, & Informed Pt to Notify Payor if Applicable           Medication Adherence /    Statin Use []  Primary Care Appointment Scheduled    []  Patient Reminded to  Prescription    []  Patient Declined, Provider Notified if Needed    []  Sent Provider Message to Review to Evaluate Pt for Statin, Add Exclusion Dx Codes, Document   Exclusion in Problem List, Change Statin Intensity Level to Moderate or High Intensity if Applicable                Osteoporosis Screening []  Dexa Order Placed    []  Dexa Appointment Scheduled    []  External Records Requested & Care Team Updated    []  External Records Uploaded, Care Team Updated, & History Updated if Applicable    []  Patient Declined Scheduling Dexa or Will Call Back to Schedule    []  Patient Will Schedule with External Provider / Order Routed & Care Team Updated if Applicable       Additional Notes:

## 2024-12-13 ENCOUNTER — OFFICE VISIT (OUTPATIENT)
Dept: FAMILY MEDICINE | Facility: CLINIC | Age: 54
End: 2024-12-13
Payer: COMMERCIAL

## 2024-12-13 VITALS
TEMPERATURE: 98 F | OXYGEN SATURATION: 100 % | BODY MASS INDEX: 41.12 KG/M2 | HEART RATE: 84 BPM | WEIGHT: 246.81 LBS | RESPIRATION RATE: 16 BRPM | HEIGHT: 65 IN | SYSTOLIC BLOOD PRESSURE: 128 MMHG | DIASTOLIC BLOOD PRESSURE: 80 MMHG

## 2024-12-13 DIAGNOSIS — D22.9 NEVUS: Chronic | ICD-10-CM

## 2024-12-13 DIAGNOSIS — R06.83 SNORING: ICD-10-CM

## 2024-12-13 DIAGNOSIS — I10 PRIMARY HYPERTENSION: Primary | Chronic | ICD-10-CM

## 2024-12-13 DIAGNOSIS — G47.19 EXCESSIVE DAYTIME SLEEPINESS: Chronic | ICD-10-CM

## 2024-12-13 NOTE — PROGRESS NOTES
Subjective     Patient ID: Ro Hitchcock is a 54 y.o. female.    Chief Complaint: Color Me Healthy (Hypertension, hyperlipidemia ) and Health Maintenance (Refuses all vaccines today.)    She does report snoring and fatigue, excessive daytime sleepiness         HPI  Review of Systems   Constitutional:  Negative for activity change, appetite change, chills, fatigue and fever.   HENT:  Negative for nasal congestion, ear pain, hearing loss, postnasal drip and sore throat.    Respiratory:  Negative for cough, chest tightness, shortness of breath and wheezing.    Cardiovascular:  Negative for chest pain, palpitations, leg swelling and claudication.   Gastrointestinal:  Negative for abdominal pain, change in bowel habit, constipation, diarrhea, nausea and vomiting.   Genitourinary:  Negative for dysuria.   Musculoskeletal:  Negative for arthralgias, back pain, gait problem and myalgias.   Integumentary:  Negative for rash.   Neurological:  Negative for weakness and headaches.   Psychiatric/Behavioral:  Negative for suicidal ideas. The patient is not nervous/anxious.        Tobacco Use: High Risk (12/13/2024)    Patient History     Smoking Tobacco Use: Every Day     Smokeless Tobacco Use: Never     Passive Exposure: Never     Review of patient's allergies indicates:  No Known Allergies  Current Outpatient Medications   Medication Instructions    aspirin (ECOTRIN) 81 MG EC tablet 1 tablet, Every morning    clopidogreL (PLAVIX) 75 mg, Daily    losartan (COZAAR) 50 mg, Daily    rosuvastatin (CRESTOR) 40 mg, Nightly    sertraline (ZOLOFT) 50 mg, Daily     Medications Discontinued During This Encounter   Medication Reason    erythromycin (ROMYCIN) ophthalmic ointment     triamcinolone acetonide 0.1% (KENALOG) 0.1 % paste        Past Medical History:   Diagnosis Date    Breast cancer screening by mammogram 08/07/2024    [facility]    Coronary artery disease     stent placed 9/19/22    GERD (gastroesophageal reflux disease)   "   Hyperlipidemia     Hypertension      Health Maintenance Topics with due status: Not Due       Topic Last Completion Date    Lipid Panel 06/13/2024    Mammogram 08/07/2024    High Dose Statin 12/13/2024    RSV Vaccine (Age 60+ and Pregnant patients) Not Due     Immunization History   Administered Date(s) Administered    Influenza - Quadrivalent - PF *Preferred* (6 months and older) 09/19/2022    Pneumococcal Conjugate - 20 Valent 06/13/2024       Objective     Body mass index is 41.07 kg/m².  Wt Readings from Last 3 Encounters:   12/13/24 111.9 kg (246 lb 12.8 oz)   11/19/24 109.8 kg (242 lb)   06/19/24 106.6 kg (235 lb)     Ht Readings from Last 3 Encounters:   12/13/24 5' 5" (1.651 m)   11/19/24 5' 5" (1.651 m)   06/19/24 5' 5" (1.651 m)     BP Readings from Last 3 Encounters:   12/13/24 128/80   11/19/24 129/79   06/19/24 131/80     Temp Readings from Last 3 Encounters:   12/13/24 97.8 °F (36.6 °C) (Oral)   11/19/24 98.2 °F (36.8 °C) (Oral)   06/19/24 97.7 °F (36.5 °C) (Oral)     Pulse Readings from Last 3 Encounters:   12/13/24 84   11/19/24 67   06/19/24 60     Resp Readings from Last 3 Encounters:   12/13/24 16   11/19/24 18   06/19/24 18     PF Readings from Last 3 Encounters:   No data found for PF       Physical Exam  Constitutional:       Appearance: Normal appearance.   HENT:      Head: Normocephalic and atraumatic.   Eyes:      Extraocular Movements: Extraocular movements intact.   Cardiovascular:      Rate and Rhythm: Normal rate and regular rhythm.   Pulmonary:      Effort: Pulmonary effort is normal.      Breath sounds: Normal breath sounds.   Skin:     General: Skin is warm.             Comments: See image below, mole anterior chest    Neurological:      Mental Status: She is alert and oriented to person, place, and time. Mental status is at baseline.     Anterior chest mole. Pictured here on 12/13/2024        Assessment and Plan     Problem List Items Addressed This Visit          Derm    Nevus " (Chronic)       Cardiac/Vascular    Primary hypertension - Primary (Chronic)       Other    Excessive daytime sleepiness (Chronic)    Relevant Orders    Ambulatory referral/consult to Sleep Disorders     Other Visit Diagnoses       Snoring        Relevant Orders    Ambulatory referral/consult to Sleep Disorders              Plan:       Refer to sleep medicine, for evaluation of excessive daytime sleepiness and snoring    She gets labs with Cardiology     We will monitor the nevus on her chest, it does itch, but I not growing         I have reviewed the medications, allergies, and problem list.     Goal Actions:    What type of visit is the patient here for today?: Color Me Healthy  Which Color Me Healthy program is the patient enrolling in?: Blood Pressure (Hypertension)  Is this a Wellness Follow Up?: Yes  What is your overall wellness goal? (select at least one): Improve overall health  Choose 3: Lifestyle, Nutrition, Exercise  Lifestyle Actions : BMD if applicable, Take medications as prescribed, Develop good support system  Nurtrition Actions: Recommend weight loss, Eat a well-balanced diet, Eat heart healthy diet, drink 8-10 glasses of water per day  Exercise Actions: Recommend physical activity 30 minutes per day 3-5 times/week

## 2024-12-13 NOTE — LETTER
December 13, 2024      [facility] [address] [place] [address]  Phone: [phone]  Fax: [phone]       Patient: Ro Hitchcock   YOB: 1970  Date of Visit: 12/13/2024    To Whom It May Concern:    Becky Hitchcock  was at [facility] on 12/13/2024. The patient may return to work/school on 12/16/2024 with no restrictions. If you have any questions or concerns, or if I can be of further assistance, please do not hesitate to contact me.    Sincerely,    Varun Abreu MD

## 2025-01-14 ENCOUNTER — OFFICE VISIT (OUTPATIENT)
Dept: FAMILY MEDICINE | Facility: CLINIC | Age: 55
End: 2025-01-14
Payer: COMMERCIAL

## 2025-01-14 VITALS
SYSTOLIC BLOOD PRESSURE: 131 MMHG | HEIGHT: 65 IN | DIASTOLIC BLOOD PRESSURE: 79 MMHG | WEIGHT: 248.19 LBS | RESPIRATION RATE: 19 BRPM | HEART RATE: 71 BPM | TEMPERATURE: 99 F | OXYGEN SATURATION: 99 % | BODY MASS INDEX: 41.35 KG/M2

## 2025-01-14 DIAGNOSIS — J01.00 ACUTE MAXILLARY SINUSITIS, RECURRENCE NOT SPECIFIED: ICD-10-CM

## 2025-01-14 DIAGNOSIS — R05.9 COUGH, UNSPECIFIED TYPE: Primary | ICD-10-CM

## 2025-01-14 LAB
CTP QC/QA: YES
CTP QC/QA: YES
POC MOLECULAR INFLUENZA A AGN: NEGATIVE
POC MOLECULAR INFLUENZA B AGN: NEGATIVE
POC RSV RAPID ANT MOLECULAR: NEGATIVE

## 2025-01-14 PROCEDURE — 3075F SYST BP GE 130 - 139MM HG: CPT | Mod: ,,,

## 2025-01-14 PROCEDURE — 99213 OFFICE O/P EST LOW 20 MIN: CPT | Mod: 25,,,

## 2025-01-14 PROCEDURE — 3008F BODY MASS INDEX DOCD: CPT | Mod: ,,,

## 2025-01-14 PROCEDURE — 87502 INFLUENZA DNA AMP PROBE: CPT | Mod: QW,,,

## 2025-01-14 PROCEDURE — 1160F RVW MEDS BY RX/DR IN RCRD: CPT | Mod: ,,,

## 2025-01-14 PROCEDURE — 96372 THER/PROPH/DIAG INJ SC/IM: CPT | Mod: ,,,

## 2025-01-14 PROCEDURE — 3078F DIAST BP <80 MM HG: CPT | Mod: ,,,

## 2025-01-14 PROCEDURE — 4010F ACE/ARB THERAPY RXD/TAKEN: CPT | Mod: ,,,

## 2025-01-14 PROCEDURE — 1159F MED LIST DOCD IN RCRD: CPT | Mod: ,,,

## 2025-01-14 PROCEDURE — 87634 RSV DNA/RNA AMP PROBE: CPT | Mod: QW,,,

## 2025-01-14 RX ORDER — POLYETHYLENE GLYCOL-3350 AND ELECTROLYTES 236; 6.74; 5.86; 2.97; 22.74 G/274.31G; G/274.31G; G/274.31G; G/274.31G; G/274.31G
4000 POWDER, FOR SOLUTION ORAL ONCE
COMMUNITY
Start: 2025-01-06

## 2025-01-14 RX ORDER — FLUTICASONE PROPIONATE 50 MCG
2 SPRAY, SUSPENSION (ML) NASAL DAILY
Qty: 11.1 ML | Refills: 0 | Status: SHIPPED | OUTPATIENT
Start: 2025-01-14

## 2025-01-14 RX ORDER — BENZONATATE 200 MG/1
200 CAPSULE ORAL 3 TIMES DAILY PRN
Qty: 30 CAPSULE | Refills: 0 | Status: SHIPPED | OUTPATIENT
Start: 2025-01-14 | End: 2025-01-24

## 2025-01-14 RX ORDER — CEFTRIAXONE 1 G/1
1 INJECTION, POWDER, FOR SOLUTION INTRAMUSCULAR; INTRAVENOUS
Status: COMPLETED | OUTPATIENT
Start: 2025-01-14 | End: 2025-01-14

## 2025-01-14 RX ORDER — AMOXICILLIN AND CLAVULANATE POTASSIUM 875; 125 MG/1; MG/1
1 TABLET, FILM COATED ORAL EVERY 12 HOURS
Qty: 14 TABLET | Refills: 0 | Status: SHIPPED | OUTPATIENT
Start: 2025-01-14 | End: 2025-01-21

## 2025-01-14 RX ADMIN — CEFTRIAXONE 1 G: 1 INJECTION, POWDER, FOR SOLUTION INTRAMUSCULAR; INTRAVENOUS at 09:01

## 2025-01-14 NOTE — PROGRESS NOTES
Subjective     Patient ID: Ro Hitchcock is a 54 y.o. female.    Chief Complaint: Cough (Mainly at night), Nasal Congestion, Headache, and Sinus Problem (Started two days ago. Took home covid test yesterday and it was negative. )    Cough  Associated symptoms include headaches.   Headache   Associated symptoms include coughing and sinus pressure.   Sinus Problem  Associated symptoms include coughing, headaches and sinus pressure.     Review of Systems   HENT:  Positive for sinus pressure/congestion.    Respiratory:  Positive for cough.    Neurological:  Positive for headaches.          Objective     Physical Exam  Constitutional:       Appearance: Normal appearance.   HENT:      Head: Normocephalic and atraumatic.      Right Ear: Tympanic membrane, ear canal and external ear normal.      Left Ear: Tympanic membrane, ear canal and external ear normal.      Nose: Congestion present.      Right Turbinates: Swollen.      Left Turbinates: Swollen.      Right Sinus: Maxillary sinus tenderness present.      Left Sinus: Maxillary sinus tenderness present.      Mouth/Throat:      Pharynx: Postnasal drip present.   Eyes:      Pupils: Pupils are equal, round, and reactive to light.   Cardiovascular:      Rate and Rhythm: Normal rate and regular rhythm.      Pulses: Normal pulses.      Heart sounds: Normal heart sounds.   Pulmonary:      Effort: Pulmonary effort is normal.      Breath sounds: Normal breath sounds.   Musculoskeletal:         General: Normal range of motion.      Cervical back: Normal range of motion.   Skin:     General: Skin is warm and dry.      Capillary Refill: Capillary refill takes less than 2 seconds.   Neurological:      General: No focal deficit present.      Mental Status: She is alert.   Psychiatric:         Mood and Affect: Mood normal.            Assessment and Plan     1. Cough, unspecified type  -     POCT Influenza A/B Molecular  -     POCT respiratory syncytial virus  -     benzonatate (TESSALON)  200 MG capsule; Take 1 capsule (200 mg total) by mouth 3 (three) times daily as needed for Cough.  Dispense: 30 capsule; Refill: 0    2. Acute maxillary sinusitis, recurrence not specified  -     cefTRIAXone injection 1 g  -     amoxicillin-clavulanate 875-125mg (AUGMENTIN) 875-125 mg per tablet; Take 1 tablet by mouth every 12 (twelve) hours. for 7 days  Dispense: 14 tablet; Refill: 0  -     fluticasone propionate (FLONASE) 50 mcg/actuation nasal spray; 2 sprays (100 mcg total) by Each Nostril route once daily.  Dispense: 11.1 mL; Refill: 0                 Follow up if symptoms worsen or fail to improve.

## 2025-01-14 NOTE — LETTER
January 14, 2025      [facility] [address] [place] [address]  Phone: [phone]  Fax: [phone]       Patient: Ro Hitchcock   YOB: 1970  Date of Visit: 01/14/2025    To Whom It May Concern:    Becky Hitchcock  was at [facility] on 01/14/2025. The patient may return to work/school on 01/15/2025 with no restrictions. If you have any questions or concerns, or if I can be of further assistance, please do not hesitate to contact me.    Sincerely,    ABDULAZIZ Ramirez

## 2025-01-17 ENCOUNTER — PATIENT MESSAGE (OUTPATIENT)
Dept: GASTROENTEROLOGY | Facility: CLINIC | Age: 55
End: 2025-01-17
Payer: COMMERCIAL

## 2025-01-31 ENCOUNTER — HOSPITAL ENCOUNTER (OUTPATIENT)
Dept: GASTROENTEROLOGY | Facility: HOSPITAL | Age: 55
Discharge: HOME | End: 2025-01-31
Attending: FAMILY MEDICINE | Admitting: INTERNAL MEDICINE
Payer: COMMERCIAL

## 2025-01-31 ENCOUNTER — ANESTHESIA (OUTPATIENT)
Dept: GASTROENTEROLOGY | Facility: HOSPITAL | Age: 55
End: 2025-01-31
Payer: COMMERCIAL

## 2025-01-31 ENCOUNTER — ANESTHESIA EVENT (OUTPATIENT)
Dept: GASTROENTEROLOGY | Facility: HOSPITAL | Age: 55
End: 2025-01-31
Payer: COMMERCIAL

## 2025-01-31 VITALS
OXYGEN SATURATION: 99 % | BODY MASS INDEX: 40.82 KG/M2 | SYSTOLIC BLOOD PRESSURE: 96 MMHG | TEMPERATURE: 98 F | DIASTOLIC BLOOD PRESSURE: 52 MMHG | RESPIRATION RATE: 28 BRPM | WEIGHT: 245 LBS | HEART RATE: 74 BPM | HEIGHT: 65 IN

## 2025-01-31 DIAGNOSIS — Z12.11 SCREENING FOR MALIGNANT NEOPLASM OF COLON: ICD-10-CM

## 2025-01-31 PROCEDURE — 63600175 PHARM REV CODE 636 W HCPCS: Performed by: NURSE ANESTHETIST, CERTIFIED REGISTERED

## 2025-01-31 PROCEDURE — D9220A PRA ANESTHESIA: Mod: ,,, | Performed by: NURSE ANESTHETIST, CERTIFIED REGISTERED

## 2025-01-31 PROCEDURE — 27000284 HC CANNULA NASAL: Performed by: NURSE ANESTHETIST, CERTIFIED REGISTERED

## 2025-01-31 PROCEDURE — 37000008 HC ANESTHESIA 1ST 15 MINUTES

## 2025-01-31 PROCEDURE — 88305 TISSUE EXAM BY PATHOLOGIST: CPT | Mod: TC,SUR | Performed by: INTERNAL MEDICINE

## 2025-01-31 PROCEDURE — 45385 COLONOSCOPY W/LESION REMOVAL: CPT | Mod: 33,,, | Performed by: INTERNAL MEDICINE

## 2025-01-31 PROCEDURE — 37000009 HC ANESTHESIA EA ADD 15 MINS

## 2025-01-31 PROCEDURE — 27201423 OPTIME MED/SURG SUP & DEVICES STERILE SUPPLY

## 2025-01-31 PROCEDURE — 45385 COLONOSCOPY W/LESION REMOVAL: CPT | Mod: 33 | Performed by: INTERNAL MEDICINE

## 2025-01-31 PROCEDURE — 88305 TISSUE EXAM BY PATHOLOGIST: CPT | Mod: 26,,, | Performed by: PATHOLOGY

## 2025-01-31 RX ORDER — LIDOCAINE HYDROCHLORIDE 20 MG/ML
INJECTION, SOLUTION EPIDURAL; INFILTRATION; INTRACAUDAL; PERINEURAL
Status: DISCONTINUED | OUTPATIENT
Start: 2025-01-31 | End: 2025-01-31

## 2025-01-31 RX ORDER — PROPOFOL 10 MG/ML
VIAL (ML) INTRAVENOUS
Status: DISCONTINUED | OUTPATIENT
Start: 2025-01-31 | End: 2025-01-31

## 2025-01-31 RX ORDER — SODIUM CHLORIDE 0.9 % (FLUSH) 0.9 %
10 SYRINGE (ML) INJECTION
Status: DISCONTINUED | OUTPATIENT
Start: 2025-01-31 | End: 2025-02-01 | Stop reason: HOSPADM

## 2025-01-31 RX ADMIN — PROPOFOL 50 MG: 10 INJECTION, EMULSION INTRAVENOUS at 12:01

## 2025-01-31 RX ADMIN — LIDOCAINE HYDROCHLORIDE 50 MG: 20 INJECTION, SOLUTION EPIDURAL; INFILTRATION; INTRACAUDAL; PERINEURAL at 12:01

## 2025-01-31 NOTE — LETTER
January 31, 2025      [facility]  [address] [place] [address]  Phone: [phone]  Fax: [phone]       Patient: Ro Hitchcock   YOB: 1970  Date of Visit: 01/31/2025    To Whom It May Concern:    Becky Hitchcock  was at [facility] on 01/31/2025. The patient may return to work/school on 2/03/25.    If you have any questions or concerns, or if I can be of further assistance, please do not hesitate to contact me.    Sincerely,          Jc Freeman RN

## 2025-01-31 NOTE — DISCHARGE INSTRUCTIONS
Procedure Date  1/31/25     Impression  Overall Impression:   2 subcentimeter polyps in the ascending colon were removed with cold snare  Scattered diverticulosis of moderate severity  The ileocecal valve, appendiceal orifice, cecum and ascending colon appeared normal.  (grade 2) hemorrhoids        Recommendation  Await pathology results   Repeat colonoscopy in 7 years         Outcome of procedure: successful Colonoscopy  Disposition: patient to recovery following procedure; discharge to home when appropriate parameters met  Provisions for follow up: please call my office for any unexpected symptoms like chest or abdominal pain or bleeding following your procedure.  Final Diagnosis: colon polyps      Indication  Screening for malignant neoplasm of colon

## 2025-01-31 NOTE — ANESTHESIA POSTPROCEDURE EVALUATION
Anesthesia Post Evaluation    Patient: Ro Naldo    Procedure(s) Performed:Colonoscopy    Final Anesthesia Type: MAC      Patient location during evaluation: GI PACU  Patient participation: Yes- Able to Participate  Level of consciousness: awake and alert and oriented  Post-procedure vital signs: reviewed and stable  Pain management: adequate  Airway patency: patent    PONV status at discharge: No PONV  Anesthetic complications: no      Cardiovascular status: blood pressure returned to baseline and stable  Respiratory status: unassisted, spontaneous ventilation and room air  Hydration status: euvolemic  Follow-up not needed.  Comments: Refer to nursing note for pain/barbie score upon discharge from recovery.               Vitals Value Taken Time   BP 96/52 01/31/25 1303   Temp 36.8 °C (98.2 °F) 01/31/25 1303   Pulse 75 01/31/25 1303   Resp 18 01/31/25 1303   SpO2 98 % 01/31/25 1303         No case tracking events are documented in the log.      Pain/Barbie Score: No data recorded

## 2025-01-31 NOTE — ANESTHESIA PREPROCEDURE EVALUATION
01/31/2025  Ro Hitchcock is a 54 y.o., female.      Pre-op Assessment    I have reviewed the Patient Summary Reports.     I have reviewed the Nursing Notes. I have reviewed the NPO Status.   I have reviewed the Medications.     Review of Systems  Anesthesia Hx:  No problems with previous Anesthesia             Denies Family Hx of Anesthesia complications.    Denies Personal Hx of Anesthesia complications.                    Social:  Smoker       Hematology/Oncology:  Hematology Normal   Oncology Normal                                   EENT/Dental:  EENT/Dental Normal           Cardiovascular:     Hypertension   CAD              ECG has been reviewed.      Coronary Artery Disease:                            Hypertension         Pulmonary:  Pulmonary Normal                       Renal/:  Renal/ Normal                 Hepatic/GI:  Bowel Prep.   GERD         Gerd          Musculoskeletal:  Musculoskeletal Normal                Neurological:  Neurology Normal                                      Endocrine:        Obesity / BMI > 30  Dermatological:  Skin Normal    Psych:  Psychiatric Normal                Physical Exam  General: Well nourished    Airway:  Mallampati: II   Mouth Opening: Normal  TM Distance: Normal  Tongue: Normal  Neck ROM: Normal ROM    Dental:  Intact    Anesthesia Plan  Type of Anesthesia, risks & benefits discussed:    Anesthesia Type: MAC  Intra-op Monitoring Plan: Standard ASA Monitors  Post Op Pain Control Plan: multimodal analgesia  Induction:  IV  Informed Consent: Informed consent signed with the Patient and all parties understand the risks and agree with anesthesia plan.  All questions answered. Patient consented to blood products? Yes  ASA Score: 3  Day of Surgery Review of History & Physical: H&P Update referred to the surgeon/provider.I have interviewed and examined the patient.  I have reviewed the patient's H&P dated: There are no significant changes.     Ready For Surgery From Anesthesia Perspective.   .

## 2025-01-31 NOTE — TRANSFER OF CARE
"Anesthesia Transfer of Care Note    Patient: Ro Naldo    Procedure(s) Performed: Colonoscopy    Patient location: GI    Anesthesia Type: MAC    Transport from OR: Transported from OR on room air with adequate spontaneous ventilation. Continuous ECG monitoring in transport. Continuous SpO2 monitoring in transport    Post pain: adequate analgesia    Post assessment: no apparent anesthetic complications    Post vital signs: stable    Level of consciousness: responds to stimulation, awake and sedated    Nausea/Vomiting: no nausea/vomiting    Complications: none    Transfer of care protocol was followed      Last vitals: Visit Vitals  BP (!) 96/52 (BP Location: Right arm, Patient Position: Lying)   Pulse 75   Temp 36.8 °C (98.2 °F) (Oral)   Resp 18   Ht 5' 5" (1.651 m)   Wt 111.1 kg (245 lb)   SpO2 98%   Breastfeeding No   BMI 40.77 kg/m²     "

## 2025-01-31 NOTE — H&P
Gastroenterology Pre-procedure H&P    History of Present Illness    Ro Hitchcock is a 54 y.o. female that  has a past medical history of Breast cancer screening by mammogram (08/07/2024), Coronary artery disease, GERD (gastroesophageal reflux disease), Hyperlipidemia, and Hypertension.     Patient here for routine screening     Patient denies wt loss, abdominal pain, diarrhea, melena/hematochezia, change in stool caliber, no anticoagulants, FMHx of GI related malignancies.      Past Medical History:   Diagnosis Date    Breast cancer screening by mammogram 08/07/2024    [facility]    Coronary artery disease     stent placed 9/19/22    GERD (gastroesophageal reflux disease)     Hyperlipidemia     Hypertension        Past Surgical History:   Procedure Laterality Date    CHOLECYSTECTOMY         Family History   Problem Relation Name Age of Onset    Hypertension Mother      Diabetes Father      Cancer Sister      Hypertension Sister      Diabetes Sister         Social History     Socioeconomic History    Marital status: Single   Tobacco Use    Smoking status: Every Day     Current packs/day: 0.25     Average packs/day: 0.2 packs/day for 36.9 years (9.2 ttl pk-yrs)     Types: Cigarettes     Start date: 2/27/1988     Passive exposure: Never    Smokeless tobacco: Never   Substance and Sexual Activity    Alcohol use: Not Currently    Drug use: Never    Sexual activity: Not Currently     Social Drivers of Health     Financial Resource Strain: Medium Risk (12/6/2024)    Overall Financial Resource Strain (CARDIA)     Difficulty of Paying Living Expenses: Somewhat hard   Food Insecurity: Food Insecurity Present (12/6/2024)    Hunger Vital Sign     Worried About Running Out of Food in the Last Year: Sometimes true     Ran Out of Food in the Last Year: Never true   Physical Activity: Insufficiently Active (12/6/2024)    Exercise Vital Sign     Days of Exercise per Week: 4 days     Minutes of Exercise per Session: 30 min    Stress: Stress Concern Present (12/6/2024)    [employer] [place] [employer] - Occupational Stress Questionnaire     Feeling of Stress : To some extent   Housing Stability: High Risk (12/6/2024)    Housing Stability Vital Sign     Unable to Pay for Housing in the Last Year: Yes       Current Outpatient Medications   Medication Sig Dispense Refill    aspirin (ECOTRIN) 81 MG EC tablet Take 1 tablet by mouth every morning.      clopidogreL (PLAVIX) 75 mg tablet Take 75 mg by mouth once daily.      fluticasone propionate (FLONASE) 50 mcg/actuation nasal spray 2 sprays (100 mcg total) by Each Nostril route once daily. 11.1 mL 0    GAVILYTE-G 236-22.74-6.74 -5.86 gram suspension Take 4,000 mLs by mouth once.      losartan (COZAAR) 50 MG tablet Take 50 mg by mouth once daily.      rosuvastatin (CRESTOR) 40 MG Tab Take 40 mg by mouth every evening.      sertraline (ZOLOFT) 50 MG tablet Take 50 mg by mouth once daily.       No current facility-administered medications for this encounter.       Review of patient's allergies indicates:  No Known Allergies    Objective:  There were no vitals filed for this visit.     GEN: normal appearing, NAD, AAO x3  HENT: NCAT, anicteric, OP benign  CV: normal rate, regular rhythm  RESP: NABS, symmetric rise, unlabored  ABD: soft, ND, no guarding or TTP  SKIN: warm and dry  NEURO: grossly afocal    Assessment and Plan:    Proceed with:    Colonoscopy for screening for colon cancer    Deonte Flores MD  Gastroenterology

## 2025-02-03 LAB
ESTROGEN SERPL-MCNC: NORMAL PG/ML
INSULIN SERPL-ACNC: NORMAL U[IU]/ML
LAB AP GROSS DESCRIPTION: NORMAL
LAB AP LABORATORY NOTES: NORMAL
T3RU NFR SERPL: NORMAL %

## 2025-02-03 NOTE — PROGRESS NOTES
Dr. Abreu, thank you for referring this patient to me. I recommend repeat colonoscopy in 7 years. Please let me know if you have any questions regarding this patient.

## 2025-06-10 ENCOUNTER — OFFICE VISIT (OUTPATIENT)
Dept: FAMILY MEDICINE | Facility: CLINIC | Age: 55
End: 2025-06-10
Payer: COMMERCIAL

## 2025-06-10 VITALS
BODY MASS INDEX: 41.52 KG/M2 | TEMPERATURE: 98 F | DIASTOLIC BLOOD PRESSURE: 80 MMHG | HEART RATE: 72 BPM | WEIGHT: 249.19 LBS | OXYGEN SATURATION: 98 % | RESPIRATION RATE: 16 BRPM | HEIGHT: 65 IN | SYSTOLIC BLOOD PRESSURE: 134 MMHG

## 2025-06-10 DIAGNOSIS — E66.01 CLASS 2 SEVERE OBESITY DUE TO EXCESS CALORIES WITH SERIOUS COMORBIDITY AND BODY MASS INDEX (BMI) OF 39.0 TO 39.9 IN ADULT: Chronic | ICD-10-CM

## 2025-06-10 DIAGNOSIS — E66.812 CLASS 2 SEVERE OBESITY DUE TO EXCESS CALORIES WITH SERIOUS COMORBIDITY AND BODY MASS INDEX (BMI) OF 39.0 TO 39.9 IN ADULT: Chronic | ICD-10-CM

## 2025-06-10 DIAGNOSIS — I10 PRIMARY HYPERTENSION: Chronic | ICD-10-CM

## 2025-06-10 DIAGNOSIS — E78.2 MIXED HYPERLIPIDEMIA: Chronic | ICD-10-CM

## 2025-06-10 DIAGNOSIS — Z12.31 ENCOUNTER FOR SCREENING MAMMOGRAM FOR MALIGNANT NEOPLASM OF BREAST: ICD-10-CM

## 2025-06-10 DIAGNOSIS — K21.9 GASTROESOPHAGEAL REFLUX DISEASE WITHOUT ESOPHAGITIS: Chronic | ICD-10-CM

## 2025-06-10 DIAGNOSIS — Z13.220 SCREENING FOR LIPOID DISORDERS: ICD-10-CM

## 2025-06-10 DIAGNOSIS — Z00.01 ENCOUNTER FOR GENERAL ADULT MEDICAL EXAMINATION WITH ABNORMAL FINDINGS: Primary | ICD-10-CM

## 2025-06-10 DIAGNOSIS — Z13.1 SCREENING FOR DIABETES MELLITUS: ICD-10-CM

## 2025-06-10 DIAGNOSIS — I25.10 CORONARY ARTERY DISEASE INVOLVING NATIVE CORONARY ARTERY OF NATIVE HEART WITHOUT ANGINA PECTORIS: Chronic | ICD-10-CM

## 2025-06-10 LAB
CHOLEST SERPL-MCNC: 146 MG/DL
CHOLEST/HDLC SERPL: 3.5 {RATIO}
GLUCOSE SERPL-MCNC: 84 MG/DL (ref 74–100)
HDLC SERPL-MCNC: 42 MG/DL (ref 35–60)
LDLC SERPL CALC-MCNC: 66 MG/DL
LDLC/HDLC SERPL: 1.6 {RATIO}
NONHDLC SERPL-MCNC: 104 MG/DL
TRIGL SERPL-MCNC: 191 MG/DL (ref 37–140)
VLDLC SERPL-MCNC: 38 MG/DL

## 2025-06-10 PROCEDURE — 3075F SYST BP GE 130 - 139MM HG: CPT | Mod: ,,, | Performed by: FAMILY MEDICINE

## 2025-06-10 PROCEDURE — 80061 LIPID PANEL: CPT | Mod: ,,, | Performed by: CLINICAL MEDICAL LABORATORY

## 2025-06-10 PROCEDURE — 3008F BODY MASS INDEX DOCD: CPT | Mod: ,,, | Performed by: FAMILY MEDICINE

## 2025-06-10 PROCEDURE — 99396 PREV VISIT EST AGE 40-64: CPT | Mod: ,,, | Performed by: FAMILY MEDICINE

## 2025-06-10 PROCEDURE — 4010F ACE/ARB THERAPY RXD/TAKEN: CPT | Mod: ,,, | Performed by: FAMILY MEDICINE

## 2025-06-10 PROCEDURE — 3079F DIAST BP 80-89 MM HG: CPT | Mod: ,,, | Performed by: FAMILY MEDICINE

## 2025-06-10 PROCEDURE — 1159F MED LIST DOCD IN RCRD: CPT | Mod: ,,, | Performed by: FAMILY MEDICINE

## 2025-06-10 PROCEDURE — 1160F RVW MEDS BY RX/DR IN RCRD: CPT | Mod: ,,, | Performed by: FAMILY MEDICINE

## 2025-06-10 PROCEDURE — 82947 ASSAY GLUCOSE BLOOD QUANT: CPT | Mod: ,,, | Performed by: CLINICAL MEDICAL LABORATORY

## 2025-06-10 RX ORDER — ROSUVASTATIN CALCIUM 40 MG/1
40 TABLET, COATED ORAL NIGHTLY
Qty: 90 TABLET | Refills: 1 | Status: SHIPPED | OUTPATIENT
Start: 2025-06-10

## 2025-06-10 NOTE — PROGRESS NOTES
Subjective     Patient ID: Ro Hitchcock is a 55 y.o. female.    Chief Complaint: Healthy You (Hypertension, hyperlipidemia) and Health Maintenance (Hepatitis C Screening Never done refuses/Cervical Cancer Screening Never done/HIV Screening Never done refuses/TETANUS VACCINE due on 09/05/1999 refuses/Hemoglobin A1c (Diabetic Prevention Screening) refuses/Shingles Vaccine(1 of 2) Never done refuses/COVID-19 Vaccine(2 - 2024-25 season) refuses/Mammogram due on 08/07/2025 )    HPI  Review of Systems   Constitutional:  Negative for activity change, appetite change, chills, fatigue and fever.   HENT:  Negative for nasal congestion, ear pain, hearing loss, postnasal drip and sore throat.    Respiratory:  Negative for cough, chest tightness, shortness of breath and wheezing.    Cardiovascular:  Negative for chest pain, palpitations, leg swelling and claudication.   Gastrointestinal:  Negative for abdominal pain, change in bowel habit, constipation, diarrhea, nausea and vomiting.   Genitourinary:  Negative for dysuria.   Musculoskeletal:  Negative for arthralgias, back pain, gait problem and myalgias.   Integumentary:  Negative for rash.   Neurological:  Negative for weakness and headaches.   Psychiatric/Behavioral:  Negative for suicidal ideas. The patient is not nervous/anxious.        Tobacco Use: High Risk (6/10/2025)    Patient History     Smoking Tobacco Use: Every Day     Smokeless Tobacco Use: Never     Passive Exposure: Never     Review of patient's allergies indicates:  No Known Allergies  Current Outpatient Medications   Medication Instructions    aspirin (ECOTRIN) 81 MG EC tablet 1 tablet, Every morning    clopidogreL (PLAVIX) 75 mg, Daily    fluticasone propionate (FLONASE) 100 mcg, Each Nostril, Daily    losartan (COZAAR) 50 mg, Daily    rosuvastatin (CRESTOR) 40 mg, Oral, Nightly, For CHOLESTEROL    sertraline (ZOLOFT) 50 mg, Daily     Medications Discontinued During This Encounter   Medication Reason     "GAVILYTE-G 236-22.74-6.74 -5.86 gram suspension     rosuvastatin (CRESTOR) 40 MG Tab Reorder       Past Medical History:   Diagnosis Date    Breast cancer screening by mammogram 08/07/2024    [facility]    Coronary artery disease     stent placed 9/19/22    GERD (gastroesophageal reflux disease)     Hyperlipidemia     Hypertension      Health Maintenance Topics with due status: Not Due       Topic Last Completion Date    Lipid Panel 06/13/2024    High Dose Statin 01/14/2025    Colorectal Cancer Screening 01/31/2025    RSV Vaccine (Age 60+ and Pregnant patients) Not Due     Immunization History   Administered Date(s) Administered    Influenza - Quadrivalent - PF *Preferred* (6 months and older) 09/19/2022    Pneumococcal Conjugate - 20 Valent 06/13/2024       Objective     Body mass index is 41.47 kg/m².  Wt Readings from Last 3 Encounters:   06/10/25 113 kg (249 lb 3.2 oz)   01/31/25 111.1 kg (245 lb)   01/14/25 112.6 kg (248 lb 3.2 oz)     Ht Readings from Last 3 Encounters:   06/10/25 5' 5" (1.651 m)   01/31/25 5' 5" (1.651 m)   01/14/25 5' 5" (1.651 m)     BP Readings from Last 3 Encounters:   06/10/25 134/80   01/31/25 (!) 96/52   01/14/25 131/79     Temp Readings from Last 3 Encounters:   06/10/25 97.8 °F (36.6 °C) (Oral)   01/31/25 98.2 °F (36.8 °C) (Oral)   01/14/25 98.6 °F (37 °C) (Oral)     Pulse Readings from Last 3 Encounters:   06/10/25 72   01/31/25 74   01/14/25 71     Resp Readings from Last 3 Encounters:   06/10/25 16   01/31/25 (!) 28   01/14/25 19     PF Readings from Last 3 Encounters:   No data found for PF       Physical Exam  Constitutional:       Appearance: Normal appearance.   HENT:      Head: Normocephalic and atraumatic.   Eyes:      Extraocular Movements: Extraocular movements intact.   Cardiovascular:      Rate and Rhythm: Normal rate and regular rhythm.   Pulmonary:      Effort: Pulmonary effort is normal.      Breath sounds: Normal breath sounds.   Skin:     General: Skin is warm. "   Neurological:      Mental Status: She is alert and oriented to person, place, and time. Mental status is at baseline.         Assessment and Plan     Problem List Items Addressed This Visit          Cardiac/Vascular    Coronary artery disease involving native coronary artery of native heart without angina pectoris (Chronic)    Mixed hyperlipidemia (Chronic)    Relevant Medications    rosuvastatin (CRESTOR) 40 MG Tab    Primary hypertension (Chronic)       Endocrine    Class 2 severe obesity due to excess calories with serious comorbidity and body mass index (BMI) of 39.0 to 39.9 in adult (Chronic)       GI    Gastroesophageal reflux disease without esophagitis (Chronic)     Other Visit Diagnoses         Encounter for general adult medical examination with abnormal findings    -  Primary      Encounter for screening mammogram for malignant neoplasm of breast        Relevant Orders    Mammo Digital Screening Bilat w/ Bear (XPD)      Screening for diabetes mellitus        Relevant Orders    Glucose, Random      Screening for lipoid disorders        Relevant Orders    Lipid Panel            Plan:       I have reviewed the medications, allergies, and problem list.     Goal Actions:    What type of visit is the patient here for today?: Healthy You  Does the patient consent to enroll in [facility] Healthy?: Yes  Is this a Wellness Follow Up?: Yes  What is your overall wellness goal? (select at least one): Improve overall health  Choose 3: Lifestyle, Nutrition, Exercise  Lifestyle Actions : BMD if applicable, Take medications as prescribed, Develop good support system  Nurtrition Actions: Recommend weight loss, Eat a well-balanced diet, Eat heart healthy diet, drink 8-10 glasses of water per day  Exercise Actions: Recommend physical activity 30 minutes per day 3-5 times/week

## 2025-06-10 NOTE — LETTER
Kayy 10, 2025      [facility] [address] [place] [address]  Phone: [phone]  Fax: [phone]       Patient: Ro Hitchcock   YOB: 1970  Date of Visit: 06/10/2025    To Whom It May Concern:    Becky Hitchcock  was at [facility] on 06/10/2025. The patient may return to work/school on 06/11/2025 with no restrictions. If you have any questions or concerns, or if I can be of further assistance, please do not hesitate to contact me.    Sincerely,    Varun Abreu MD

## 2025-06-11 ENCOUNTER — PATIENT OUTREACH (OUTPATIENT)
Facility: HOSPITAL | Age: 55
End: 2025-06-11
Payer: COMMERCIAL

## 2025-06-11 NOTE — PROGRESS NOTES
Population Health Chart Review & Patient Outreach Details      Further Action Needed If Patient Returns Outreach:        Health Maintenance Due   Topic Date Due    Hepatitis C Screening  Never done    Cervical Cancer Screening  Never done    HIV Screening  Never done    TETANUS VACCINE  1999    Hemoglobin A1c (Diabetic Prevention Screening)  Never done    Shingles Vaccine (1 of 2) Never done    COVID-19 Vaccine ( season) 2024    Mammogram  2025          Updates Requested / Reviewed:     []  Care Everywhere    []     []  External Sources ([facility], [facility], DIS, etc.)    [] [facility]   [] [facility]   [] Other:    []  Care Team Updated   []  Removed  or Duplicate Orders   []  Immunization Reconciliation Completed / Queried    [] [place]   [] [place]   [] [place]   [] [place]      Health Maintenance Topics Addressed and Outreach Outcomes / Actions Taken:             Breast Cancer Screening []  Mammogram Order Placed    []  Mammogram Screening Scheduled    []  External Records Requested & Care Team Updated if Applicable    []  External Records Uploaded & Care Team Updated if Applicable    []  Pt Declined Scheduling Mammogram    []  Pt Will Schedule with External Provider / Order Routed & Care Team Updated if Applicable              Cervical Cancer Screening []  Pap Smear Scheduled in Primary Care or OBGYN    []  External Records Requested & Care Team Updated if Applicable       []  External Records Uploaded, Care Team Updated, & History Updated if Applicable    []  Patient Declined Scheduling Pap Smear    []  Patient Will Schedule with External Provider & Care Team Updated if Applicable                  Colorectal Cancer Screening []  Colonoscopy Case Request / Referral / Home Test Order Placed    []  External Records Requested & Care Team Updated if Applicable    []  External Records Uploaded, Care Team Updated, & History Updated if Applicable    []  Patient Declined  Completing Colon Cancer Screening    []  Patient Will Schedule with External Provider & Care Team Updated if Applicable    []  Fit Kit Mailed (add the SmartPhrase under additional notes)    []  Reminded Patient to Complete Home Test                Diabetic Eye Exam []  Eye Exam Screening Order Placed    []  Eye Camera Scheduled or Optometry/Ophthalmology Referral Placed    []  External Records Requested & Care Team Updated if Applicable    []  External Records Uploaded, Care Team Updated, & History Updated if Applicable    []  Patient Declined Scheduling Eye Exam    []  Patient Will Schedule with External Provider & Care Team Updated if Applicable             Blood Pressure Control []  Primary Care Follow Up Visit Scheduled     []  Remote Blood Pressure Reading Captured    []  Patient Declined Remote Reading or Scheduling Appt - Escalated to PCP    []  Patient Will Call Back or Send Portal Message with Reading                 HbA1c & Other Labs []  Overdue Lab(s) Ordered    []  Overdue Lab(s) Scheduled    []  External Records Uploaded & Care Team Updated if Applicable    []  Primary Care Follow Up Visit Scheduled     []  Reminded Patient to Complete A1c Home Test    []  Patient Declined Scheduling Labs or Will Call Back to Schedule    []  Patient Will Schedule with External Provider / Order Routed, & Care Team Updated if Applicable           Primary Care Appointment []  Primary Care Appt Scheduled    []  Patient Declined Scheduling or Will Call Back to Schedule    []  Pt Established with External Provider, Updated Care Team, & Informed Pt to Notify Payor if Applicable           Medication Adherence /    Statin Use []  Primary Care Appointment Scheduled    []  Patient Reminded to  Prescription    []  Patient Declined, Provider Notified if Needed    []  Sent Provider Message to Review to Evaluate Pt for Statin, Add Exclusion Dx Codes, Document   Exclusion in Problem List, Change Statin Intensity Level to  Moderate or High Intensity if Applicable                Osteoporosis Screening []  Dexa Order Placed    []  Dexa Appointment Scheduled    []  External Records Requested & Care Team Updated    []  External Records Uploaded, Care Team Updated, & History Updated if Applicable    []  Patient Declined Scheduling Dexa or Will Call Back to Schedule    []  Patient Will Schedule with External Provider / Order Routed & Care Team Updated if Applicable       Additional Notes:       Post visit Population Health review of encounter with date of service  6/10/25 with Linares.  All required HY components in encounter.    Followup appt for: 6/16/25 HY

## 2025-06-15 ENCOUNTER — RESULTS FOLLOW-UP (OUTPATIENT)
Dept: FAMILY MEDICINE | Facility: CLINIC | Age: 55
End: 2025-06-15

## 2025-06-16 ENCOUNTER — PATIENT MESSAGE (OUTPATIENT)
Dept: ADMINISTRATIVE | Facility: HOSPITAL | Age: 55
End: 2025-06-16

## 2025-07-11 ENCOUNTER — PATIENT OUTREACH (OUTPATIENT)
Facility: HOSPITAL | Age: 55
End: 2025-07-11
Payer: COMMERCIAL

## 2025-07-11 NOTE — PROGRESS NOTES
Per [employer] website, insurance is active and patient is enrolled in CM:    CM! Provider CM! Benefit Start Date CMH! Benefit End Date Baseline Risk Track(s) Baseline Risk Level Current Risk Tracks(s) Current Risk Level   SAMANTHA KOO MD 06/11/2025 08/09/2026 Hypertension, Cholesterol Low Hypertension, Cholesterol Low   Color Me Healthy! Services Guide   Color Me Healthy! Services  CPT Codes     Lipid Profile  (0 of 2 Completed) View CPT Codes »    Metabolic Visit  (0 of 2 Completed) View CPT Codes »    Microalbumin  (0 of 1 Completed) View CPT Codes »    Medication Monitoring-Renal  (0 of 2 Completed) View CPT Codes »    Medication Monitoring-Liver  (0 of 2 Completed) View CPT Codes »    Dilated Eye Exam  (0 of 1 Completed) View CPT Codes »    Venipuncture  (0 of 2 Completed) View CPT Codes »

## 2025-07-29 ENCOUNTER — PATIENT OUTREACH (OUTPATIENT)
Facility: HOSPITAL | Age: 55
End: 2025-07-29
Payer: COMMERCIAL

## 2025-07-29 NOTE — PROGRESS NOTES
.Population Health Review...  Working [facility]/HY spreadsheet of denied claims  All biometrics/wellness plans in [facility] website  Can be resubmitted

## 2025-08-28 ENCOUNTER — HOSPITAL ENCOUNTER (OUTPATIENT)
Dept: RADIOLOGY | Facility: HOSPITAL | Age: 55
Discharge: HOME | End: 2025-08-28
Payer: COMMERCIAL

## 2025-08-28 ENCOUNTER — OFFICE VISIT (OUTPATIENT)
Dept: FAMILY MEDICINE | Facility: CLINIC | Age: 55
End: 2025-08-28
Payer: COMMERCIAL

## 2025-08-28 VITALS
HEART RATE: 79 BPM | HEIGHT: 65 IN | DIASTOLIC BLOOD PRESSURE: 79 MMHG | RESPIRATION RATE: 18 BRPM | BODY MASS INDEX: 40.32 KG/M2 | TEMPERATURE: 99 F | OXYGEN SATURATION: 98 % | WEIGHT: 242 LBS | SYSTOLIC BLOOD PRESSURE: 117 MMHG

## 2025-08-28 DIAGNOSIS — R10.9 ABDOMINAL PAIN, UNSPECIFIED ABDOMINAL LOCATION: ICD-10-CM

## 2025-08-28 DIAGNOSIS — R10.9 ABDOMINAL PAIN, UNSPECIFIED ABDOMINAL LOCATION: Primary | ICD-10-CM

## 2025-08-28 DIAGNOSIS — R11.0 NAUSEA: ICD-10-CM

## 2025-08-28 DIAGNOSIS — Z13.1 SCREENING FOR DIABETES MELLITUS: ICD-10-CM

## 2025-08-28 LAB
CTP QC/QA: YES
CTP QC/QA: YES
EST. AVERAGE GLUCOSE BLD GHB EST-MCNC: 154 MG/DL
HBA1C MFR BLD HPLC: 7 %
POC MOLECULAR INFLUENZA A AGN: NEGATIVE
POC MOLECULAR INFLUENZA B AGN: NEGATIVE
SARS-COV-2 RDRP RESP QL NAA+PROBE: NEGATIVE

## 2025-08-28 PROCEDURE — 74018 RADEX ABDOMEN 1 VIEW: CPT | Mod: TC,PN

## 2025-08-28 PROCEDURE — 83036 HEMOGLOBIN GLYCOSYLATED A1C: CPT | Mod: ,,, | Performed by: CLINICAL MEDICAL LABORATORY

## 2025-08-28 PROCEDURE — 87502 INFLUENZA DNA AMP PROBE: CPT | Mod: QW,,,

## 2025-08-28 RX ORDER — ONDANSETRON 8 MG/1
8 TABLET, FILM COATED ORAL 3 TIMES DAILY PRN
Qty: 30 TABLET | Refills: 0 | Status: SHIPPED | OUTPATIENT
Start: 2025-08-28

## 2025-08-28 RX ORDER — DICYCLOMINE HYDROCHLORIDE 20 MG/1
20 TABLET ORAL EVERY 6 HOURS
Qty: 45 TABLET | Refills: 0 | Status: SHIPPED | OUTPATIENT
Start: 2025-08-28

## 2025-08-29 ENCOUNTER — PATIENT OUTREACH (OUTPATIENT)
Facility: HOSPITAL | Age: 55
End: 2025-08-29
Payer: COMMERCIAL